# Patient Record
Sex: FEMALE | Race: WHITE | NOT HISPANIC OR LATINO | URBAN - METROPOLITAN AREA
[De-identification: names, ages, dates, MRNs, and addresses within clinical notes are randomized per-mention and may not be internally consistent; named-entity substitution may affect disease eponyms.]

---

## 2024-08-12 ENCOUNTER — APPOINTMENT (OUTPATIENT)
Dept: NEUROLOGY | Facility: HOSPITAL | Age: 39
End: 2024-08-12
Payer: COMMERCIAL

## 2024-09-30 ENCOUNTER — OFFICE VISIT (OUTPATIENT)
Dept: NEUROLOGY | Facility: HOSPITAL | Age: 39
End: 2024-09-30
Payer: COMMERCIAL

## 2024-09-30 VITALS
HEIGHT: 61 IN | BODY MASS INDEX: 24.73 KG/M2 | HEART RATE: 82 BPM | RESPIRATION RATE: 18 BRPM | WEIGHT: 131 LBS | SYSTOLIC BLOOD PRESSURE: 103 MMHG | DIASTOLIC BLOOD PRESSURE: 70 MMHG

## 2024-09-30 DIAGNOSIS — G40.909 SEIZURE DISORDER (MULTI): Primary | ICD-10-CM

## 2024-09-30 PROCEDURE — 99215 OFFICE O/P EST HI 40 MIN: CPT | Performed by: STUDENT IN AN ORGANIZED HEALTH CARE EDUCATION/TRAINING PROGRAM

## 2024-09-30 PROCEDURE — 99205 OFFICE O/P NEW HI 60 MIN: CPT | Performed by: STUDENT IN AN ORGANIZED HEALTH CARE EDUCATION/TRAINING PROGRAM

## 2024-09-30 PROCEDURE — 3008F BODY MASS INDEX DOCD: CPT | Performed by: STUDENT IN AN ORGANIZED HEALTH CARE EDUCATION/TRAINING PROGRAM

## 2024-09-30 PROCEDURE — 1036F TOBACCO NON-USER: CPT | Performed by: STUDENT IN AN ORGANIZED HEALTH CARE EDUCATION/TRAINING PROGRAM

## 2024-09-30 RX ORDER — ATOMOXETINE 80 MG/1
1 CAPSULE ORAL DAILY
COMMUNITY
Start: 2024-07-23

## 2024-09-30 RX ORDER — LEVETIRACETAM 750 MG/1
750 TABLET ORAL DAILY
COMMUNITY
Start: 2024-05-09 | End: 2024-09-30 | Stop reason: SDUPTHER

## 2024-09-30 RX ORDER — LEVETIRACETAM 500 MG/1
500 TABLET ORAL 2 TIMES DAILY
Qty: 60 TABLET | Refills: 1 | Status: SHIPPED | OUTPATIENT
Start: 2024-09-30 | End: 2024-11-29

## 2024-09-30 RX ORDER — FLUOXETINE HYDROCHLORIDE 20 MG/1
20 CAPSULE ORAL 2 TIMES DAILY
COMMUNITY
Start: 2024-03-04

## 2024-09-30 NOTE — PATIENT INSTRUCTIONS
You have been seen today in Epilepsy clinic for your seizures. As we discussed:    - Please continue taking Keppra 500mg twice daily for now    -  We would like to schedule an EMU admission for 3-5 days(on average) . This would allow us to better understand the nature of your episodes and make decisions about treatment.    - We would like to get an MRI scan of your brain with epilepsy protocol    - As we discussed, you must not drive, use heavy machinery, climb heights, or engage in any other activity that would cause harm/death to yourself or others were you to lose awareness/consciousness and have a seizure. These restrictions should stay in place until at least 6 months of seizure freedom and clearance from physician. Alcohol and sleep deprivation may provoke seizures. It is best to avoid alcohol and to get sufficient sleep each night.    - If you have any questions, please call my office   If you have any sudden new concerning or worsening symptoms, call 911 and go to the Emergency Room. Otherwise, it was good seeing you today, and we will see you back after emu admission

## 2024-09-30 NOTE — PROGRESS NOTES
Gloria Vanegas is a 39 y.o. year old  right-handed female who presents for evaluation of seizure    Onset (date): 2015    Evolution and Type(s):    First episode in 2015, when she had 3 GTC  in less than 24 hr, in the setting of Bupropion use. She was on Keppra for few years and eventually weaned off. Next episode in May 2024. She was teaching at work, and felt dizzy.  Next thing she remembers is waking up with other teacher around. She was told she was convulsing and foaming through the mouth. She went to [facility], lactate was elevated. She was placed on Keppra 750 mg bid, but currently taking 750 mg daily with no further seizures. The episode happened also in the setting of double dose of Atomoxetine  (160 mg ) + prozac 80mg.     Risk Factors (including gestational/birth history):  The patient was the product of a normal spontaneous vaginal delivery of a full term birth.  There was no history of febrile convulsions or CNS infections.    Development was normal and developmental milestones were up to par with age.    No history of head trauma with loss of consciousness.   There are no other risk factors for epilepsy.     Past Antiepileptic medication:     Current Antiepileptic Medications:     Past Medical History:   Diagnosis Date    Anesthesia of skin     Numbness and tingling    Anxiety disorder, unspecified     Anxiety    Cervicalgia     Neck pain    Other amnesia     Memory difficulty    Personal history of other specified conditions     History of headache    Personal history of other specified conditions     History of vertigo    Personal history of other specified conditions     History of weakness    Sleep disorder, unspecified     Sleep difficulties        Past Surgical History:   Procedure Laterality Date    OTHER SURGICAL HISTORY  11/21/2018    Appendectomy        Social History     Social History Narrative    Not on file        Allergies   Allergen Reactions    Doxycycline Anaphylaxis     "Codeine Rash        Review of Systems  As per HPI, otherwise all other systems have been reviewed are negative for complaint.           Objective   /70   Pulse 82   Resp 18   Ht 1.549 m (5' 1\")   Wt 59.4 kg (131 lb)   BMI 24.75 kg/m²     Neurological Exam  The patient was alert and oriented to person, time and place. Language, concentration, and memory were intact.  Visual fields were full to confrontation testing. Pupils were equal and reactive to light bilaterally. Extra ocular muscles were normal.  Facial sensation and strength were normal. Tongue movement and shoulder shrug were normal bilaterally.   Gait was normal to toe, heel, and tandem walking without ataxia.  Power, muscle tone, and bulk were normal in all the extremities.  There was no pronator drift or fixation.  Rapid alternating movements were normal.  There was no adventitious movement.  Coordination was intact with no dysmetria.  Sensation was intact.  Tendon reflexes were 2+/4 in all four extremities.  Babinski maneuver elicits flexor response bilaterally.       Results    No MRI head results found for the past 12 months         Assessment/Plan   39 years old patient with 2 seizures inher life, first in 2015 in the setting of Bupropion, and second in 05/2024  after mistakenly took double dose of Atomoxetine and prozac. Prior MRI with no n specifica white matter hyperintensity in the left frontal region. EEG apparently normal.      4D Classification  EPILEPTIC Paroxysmal Events  EZ: unknown  Semiology: aura-> GTC  History of Status Epilepticus:   Etiology: Unknown  Comorbidities: ADHD, anxiety    Plan:   Will continue Keppra 500 mg bid for now  Will do EMU evaluation for evaluation off ASM  MRIepilepsy protocol  Will follow up after emu admission    "

## 2024-11-12 DIAGNOSIS — R56.9 SEIZURE (MULTI): Primary | ICD-10-CM

## 2024-11-22 ENCOUNTER — HOSPITAL ENCOUNTER (INPATIENT)
Dept: NEUROLOGY | Facility: HOSPITAL | Age: 39
DRG: 101 | End: 2024-11-22
Attending: STUDENT IN AN ORGANIZED HEALTH CARE EDUCATION/TRAINING PROGRAM | Admitting: STUDENT IN AN ORGANIZED HEALTH CARE EDUCATION/TRAINING PROGRAM
Payer: COMMERCIAL

## 2024-11-22 ENCOUNTER — APPOINTMENT (OUTPATIENT)
Dept: CARDIOLOGY | Facility: HOSPITAL | Age: 39
DRG: 101 | End: 2024-11-22
Payer: COMMERCIAL

## 2024-11-22 DIAGNOSIS — G40.909 SEIZURE DISORDER (MULTI): Primary | ICD-10-CM

## 2024-11-22 DIAGNOSIS — H92.02 EAR DISCOMFORT, LEFT: ICD-10-CM

## 2024-11-22 DIAGNOSIS — F33.0 MAJOR DEPRESSIVE DISORDER, RECURRENT, MILD (CMS-HCC): ICD-10-CM

## 2024-11-22 LAB
ALBUMIN SERPL BCP-MCNC: 4 G/DL (ref 3.4–5)
ALP SERPL-CCNC: 57 U/L (ref 33–110)
ALT SERPL W P-5'-P-CCNC: 11 U/L (ref 7–45)
ANION GAP SERPL CALC-SCNC: 11 MMOL/L (ref 10–20)
AST SERPL W P-5'-P-CCNC: 11 U/L (ref 9–39)
BASOPHILS # BLD AUTO: 0.08 X10*3/UL (ref 0–0.1)
BASOPHILS NFR BLD AUTO: 0.9 %
BILIRUB SERPL-MCNC: 0.3 MG/DL (ref 0–1.2)
BUN SERPL-MCNC: 11 MG/DL (ref 6–23)
CALCIUM SERPL-MCNC: 8.8 MG/DL (ref 8.6–10.6)
CHLORIDE SERPL-SCNC: 105 MMOL/L (ref 98–107)
CO2 SERPL-SCNC: 26 MMOL/L (ref 21–32)
CREAT SERPL-MCNC: 0.6 MG/DL (ref 0.5–1.05)
EGFRCR SERPLBLD CKD-EPI 2021: >90 ML/MIN/1.73M*2
EOSINOPHIL # BLD AUTO: 0.13 X10*3/UL (ref 0–0.7)
EOSINOPHIL NFR BLD AUTO: 1.4 %
ERYTHROCYTE [DISTWIDTH] IN BLOOD BY AUTOMATED COUNT: 12 % (ref 11.5–14.5)
GLUCOSE SERPL-MCNC: 88 MG/DL (ref 74–99)
HCT VFR BLD AUTO: 33 % (ref 36–46)
HGB BLD-MCNC: 11.6 G/DL (ref 12–16)
IMM GRANULOCYTES # BLD AUTO: 0.03 X10*3/UL (ref 0–0.7)
IMM GRANULOCYTES NFR BLD AUTO: 0.3 % (ref 0–0.9)
LEVETIRACETAM SERPL-MCNC: <2 UG/ML (ref 10–40)
LYMPHOCYTES # BLD AUTO: 2.45 X10*3/UL (ref 1.2–4.8)
LYMPHOCYTES NFR BLD AUTO: 26.5 %
MCH RBC QN AUTO: 31.8 PG (ref 26–34)
MCHC RBC AUTO-ENTMCNC: 35.2 G/DL (ref 32–36)
MCV RBC AUTO: 90 FL (ref 80–100)
MONOCYTES # BLD AUTO: 0.42 X10*3/UL (ref 0.1–1)
MONOCYTES NFR BLD AUTO: 4.5 %
NEUTROPHILS # BLD AUTO: 6.14 X10*3/UL (ref 1.2–7.7)
NEUTROPHILS NFR BLD AUTO: 66.4 %
NRBC BLD-RTO: 0 /100 WBCS (ref 0–0)
PLATELET # BLD AUTO: 377 X10*3/UL (ref 150–450)
POTASSIUM SERPL-SCNC: 3.8 MMOL/L (ref 3.5–5.3)
PROT SERPL-MCNC: 5.7 G/DL (ref 6.4–8.2)
RBC # BLD AUTO: 3.65 X10*6/UL (ref 4–5.2)
SODIUM SERPL-SCNC: 138 MMOL/L (ref 136–145)
WBC # BLD AUTO: 9.3 X10*3/UL (ref 4.4–11.3)

## 2024-11-22 PROCEDURE — 82565 ASSAY OF CREATININE: CPT

## 2024-11-22 PROCEDURE — 36415 COLL VENOUS BLD VENIPUNCTURE: CPT

## 2024-11-22 PROCEDURE — 85025 COMPLETE CBC W/AUTO DIFF WBC: CPT

## 2024-11-22 PROCEDURE — 93005 ELECTROCARDIOGRAM TRACING: CPT

## 2024-11-22 PROCEDURE — 80177 DRUG SCRN QUAN LEVETIRACETAM: CPT

## 2024-11-22 PROCEDURE — 1100000003 HC PRIVATE ROOM DAILY - LAKESIDE

## 2024-11-22 RX ORDER — ACETAMINOPHEN 650 MG/1
650 SUPPOSITORY RECTAL EVERY 4 HOURS PRN
Status: DISCONTINUED | OUTPATIENT
Start: 2024-11-22 | End: 2024-11-25 | Stop reason: HOSPADM

## 2024-11-22 RX ORDER — ACETAMINOPHEN 325 MG/1
650 TABLET ORAL EVERY 4 HOURS PRN
Status: DISCONTINUED | OUTPATIENT
Start: 2024-11-22 | End: 2024-11-25 | Stop reason: HOSPADM

## 2024-11-22 RX ORDER — FLUOXETINE HYDROCHLORIDE 20 MG/1
20 CAPSULE ORAL 2 TIMES DAILY
Status: DISCONTINUED | OUTPATIENT
Start: 2024-11-22 | End: 2024-11-22

## 2024-11-22 RX ORDER — ACETAMINOPHEN 160 MG/5ML
650 SOLUTION ORAL EVERY 4 HOURS PRN
Status: DISCONTINUED | OUTPATIENT
Start: 2024-11-22 | End: 2024-11-25 | Stop reason: HOSPADM

## 2024-11-22 RX ORDER — ATOMOXETINE 80 MG/1
80 CAPSULE ORAL DAILY
Status: DISCONTINUED | OUTPATIENT
Start: 2024-11-22 | End: 2024-11-25 | Stop reason: HOSPADM

## 2024-11-22 RX ORDER — LORAZEPAM 2 MG/ML
2 INJECTION INTRAMUSCULAR EVERY 5 MIN PRN
Status: DISCONTINUED | OUTPATIENT
Start: 2024-11-22 | End: 2024-11-25 | Stop reason: HOSPADM

## 2024-11-22 RX ORDER — FLUOXETINE HYDROCHLORIDE 40 MG/1
40 CAPSULE ORAL DAILY
Status: DISCONTINUED | OUTPATIENT
Start: 2024-11-23 | End: 2024-11-25 | Stop reason: HOSPADM

## 2024-11-22 RX ORDER — DIPHENHYDRAMINE HCL 25 MG
25 CAPSULE ORAL EVERY 6 HOURS PRN
Status: DISCONTINUED | OUTPATIENT
Start: 2024-11-22 | End: 2024-11-25 | Stop reason: HOSPADM

## 2024-11-22 RX ORDER — ATOMOXETINE 80 MG/1
80 CAPSULE ORAL DAILY
Status: DISCONTINUED | OUTPATIENT
Start: 2024-11-22 | End: 2024-11-22

## 2024-11-22 RX ORDER — LEVOTHYROXINE SODIUM 50 UG/1
50 TABLET ORAL
COMMUNITY
Start: 2024-10-30

## 2024-11-22 RX ORDER — FLUOXETINE HYDROCHLORIDE 40 MG/1
40 CAPSULE ORAL DAILY
COMMUNITY
Start: 2024-05-07

## 2024-11-22 RX ORDER — LEVOTHYROXINE SODIUM 50 UG/1
50 TABLET ORAL DAILY
Status: DISCONTINUED | OUTPATIENT
Start: 2024-11-23 | End: 2024-11-25 | Stop reason: HOSPADM

## 2024-11-22 SDOH — SOCIAL STABILITY: SOCIAL INSECURITY: DO YOU FEEL ANYONE HAS EXPLOITED OR TAKEN ADVANTAGE OF YOU FINANCIALLY OR OF YOUR PERSONAL PROPERTY?: NO

## 2024-11-22 SDOH — SOCIAL STABILITY: SOCIAL INSECURITY: ABUSE: ADULT

## 2024-11-22 SDOH — SOCIAL STABILITY: SOCIAL INSECURITY
WITHIN THE LAST YEAR, HAVE YOU BEEN RAPED OR FORCED TO HAVE ANY KIND OF SEXUAL ACTIVITY BY YOUR PARTNER OR EX-PARTNER?: NO

## 2024-11-22 SDOH — ECONOMIC STABILITY: FOOD INSECURITY: WITHIN THE PAST 12 MONTHS, THE FOOD YOU BOUGHT JUST DIDN'T LAST AND YOU DIDN'T HAVE MONEY TO GET MORE.: NEVER TRUE

## 2024-11-22 SDOH — SOCIAL STABILITY: SOCIAL INSECURITY: WITHIN THE LAST YEAR, HAVE YOU BEEN HUMILIATED OR EMOTIONALLY ABUSED IN OTHER WAYS BY YOUR PARTNER OR EX-PARTNER?: NO

## 2024-11-22 SDOH — ECONOMIC STABILITY: FOOD INSECURITY: WITHIN THE PAST 12 MONTHS, YOU WORRIED THAT YOUR FOOD WOULD RUN OUT BEFORE YOU GOT THE MONEY TO BUY MORE.: NEVER TRUE

## 2024-11-22 SDOH — SOCIAL STABILITY: SOCIAL INSECURITY: DO YOU FEEL UNSAFE GOING BACK TO THE PLACE WHERE YOU ARE LIVING?: NO

## 2024-11-22 SDOH — SOCIAL STABILITY: SOCIAL INSECURITY
WITHIN THE LAST YEAR, HAVE YOU BEEN KICKED, HIT, SLAPPED, OR OTHERWISE PHYSICALLY HURT BY YOUR PARTNER OR EX-PARTNER?: NO

## 2024-11-22 SDOH — ECONOMIC STABILITY: INCOME INSECURITY: IN THE PAST 12 MONTHS HAS THE ELECTRIC, GAS, OIL, OR WATER COMPANY THREATENED TO SHUT OFF SERVICES IN YOUR HOME?: NO

## 2024-11-22 SDOH — SOCIAL STABILITY: SOCIAL INSECURITY: DOES ANYONE TRY TO KEEP YOU FROM HAVING/CONTACTING OTHER FRIENDS OR DOING THINGS OUTSIDE YOUR HOME?: NO

## 2024-11-22 SDOH — SOCIAL STABILITY: SOCIAL INSECURITY: WITHIN THE LAST YEAR, HAVE YOU BEEN AFRAID OF YOUR PARTNER OR EX-PARTNER?: NO

## 2024-11-22 SDOH — SOCIAL STABILITY: SOCIAL INSECURITY: HAS ANYONE EVER THREATENED TO HURT YOUR FAMILY OR YOUR PETS?: NO

## 2024-11-22 SDOH — SOCIAL STABILITY: SOCIAL INSECURITY: ARE YOU OR HAVE YOU BEEN THREATENED OR ABUSED PHYSICALLY, EMOTIONALLY, OR SEXUALLY BY ANYONE?: NO

## 2024-11-22 SDOH — SOCIAL STABILITY: SOCIAL INSECURITY: HAVE YOU HAD THOUGHTS OF HARMING ANYONE ELSE?: NO

## 2024-11-22 SDOH — SOCIAL STABILITY: SOCIAL INSECURITY: ARE THERE ANY APPARENT SIGNS OF INJURIES/BEHAVIORS THAT COULD BE RELATED TO ABUSE/NEGLECT?: NO

## 2024-11-22 SDOH — SOCIAL STABILITY: SOCIAL INSECURITY: WERE YOU ABLE TO COMPLETE ALL THE BEHAVIORAL HEALTH SCREENINGS?: YES

## 2024-11-22 ASSESSMENT — COGNITIVE AND FUNCTIONAL STATUS - GENERAL
DAILY ACTIVITIY SCORE: 24
PATIENT BASELINE BEDBOUND: NO
MOBILITY SCORE: 24
MOBILITY SCORE: 24
DAILY ACTIVITIY SCORE: 24

## 2024-11-22 ASSESSMENT — ENCOUNTER SYMPTOMS
DIZZINESS: 1
EYE PAIN: 0
EYE DISCHARGE: 0
CHEST TIGHTNESS: 0
DIARRHEA: 0
CONSTIPATION: 1
DIFFICULTY URINATING: 0
SORE THROAT: 0
PALPITATIONS: 1
RHINORRHEA: 0
ACTIVITY CHANGE: 0
APNEA: 0
SHORTNESS OF BREATH: 0
NAUSEA: 0
COUGH: 0
HEADACHES: 1

## 2024-11-22 ASSESSMENT — ACTIVITIES OF DAILY LIVING (ADL)
TOILETING: INDEPENDENT
FEEDING YOURSELF: INDEPENDENT
DRESSING YOURSELF: INDEPENDENT
HEARING - RIGHT EAR: FUNCTIONAL
BATHING: INDEPENDENT
WALKS IN HOME: INDEPENDENT
PATIENT'S MEMORY ADEQUATE TO SAFELY COMPLETE DAILY ACTIVITIES?: YES
GROOMING: INDEPENDENT
LACK_OF_TRANSPORTATION: NO
ADEQUATE_TO_COMPLETE_ADL: YES
JUDGMENT_ADEQUATE_SAFELY_COMPLETE_DAILY_ACTIVITIES: YES
LACK_OF_TRANSPORTATION: NO
HEARING - LEFT EAR: FUNCTIONAL

## 2024-11-22 ASSESSMENT — COLUMBIA-SUICIDE SEVERITY RATING SCALE - C-SSRS
2. HAVE YOU ACTUALLY HAD ANY THOUGHTS OF KILLING YOURSELF?: NO
1. IN THE PAST MONTH, HAVE YOU WISHED YOU WERE DEAD OR WISHED YOU COULD GO TO SLEEP AND NOT WAKE UP?: NO
6. HAVE YOU EVER DONE ANYTHING, STARTED TO DO ANYTHING, OR PREPARED TO DO ANYTHING TO END YOUR LIFE?: NO

## 2024-11-22 ASSESSMENT — LIFESTYLE VARIABLES
AUDIT-C TOTAL SCORE: 0
HOW OFTEN DO YOU HAVE A DRINK CONTAINING ALCOHOL: NEVER
SKIP TO QUESTIONS 9-10: 1
HOW OFTEN DO YOU HAVE 6 OR MORE DRINKS ON ONE OCCASION: NEVER
HOW MANY STANDARD DRINKS CONTAINING ALCOHOL DO YOU HAVE ON A TYPICAL DAY: PATIENT DOES NOT DRINK
AUDIT-C TOTAL SCORE: 0

## 2024-11-22 ASSESSMENT — PAIN - FUNCTIONAL ASSESSMENT
PAIN_FUNCTIONAL_ASSESSMENT: 0-10
PAIN_FUNCTIONAL_ASSESSMENT: 0-10

## 2024-11-22 ASSESSMENT — PAIN SCALES - GENERAL
PAINLEVEL_OUTOF10: 0 - NO PAIN
PAINLEVEL_OUTOF10: 0 - NO PAIN

## 2024-11-22 ASSESSMENT — PATIENT HEALTH QUESTIONNAIRE - PHQ9
1. LITTLE INTEREST OR PLEASURE IN DOING THINGS: SEVERAL DAYS
2. FEELING DOWN, DEPRESSED OR HOPELESS: SEVERAL DAYS
SUM OF ALL RESPONSES TO PHQ9 QUESTIONS 1 & 2: 2

## 2024-11-22 ASSESSMENT — VISUAL ACUITY: VA_NORMAL: 1

## 2024-11-22 NOTE — PROGRESS NOTES
"Pharmacy Medication History Review    Gloria Vanegas \"Pooja\" is a 39 y.o. female admitted for Seizure disorder ([facility]). Pharmacy reviewed the patient's zasje-bp-xrutqqoad medications and allergies for accuracy.    The list below reflects the updated PTA list.   Prior to Admission Medications   Prescriptions Last Dose Informant   FLUoxetine (PROzac) 40 mg capsule 11/22/2024 Self   Sig: Take 1 capsule (40 mg) by mouth once daily.   atomoxetine (Strattera) 80 mg capsule 11/22/2024 Self   Sig: Take 1 capsule (80 mg) by mouth once daily.   levETIRAcetam (Keppra) 500 mg tablet 11/22/2024 Self   Sig: Take 1 tablet (500 mg) by mouth 2 times a day.   levothyroxine (Synthroid, Levoxyl) 50 mcg tablet 11/22/2024 Self   Sig: Take 1 tablet (50 mcg) by mouth once daily.      Facility-Administered Medications: None        The list below reflects the updated allergy list. Please review each documented allergy for additional clarification and justification.  Allergies  Reviewed by Kelli Schafer RN on 11/22/2024        Severity Reactions Comments    Doxycycline High Anaphylaxis     Codeine Low Rash             Patient declines M2B at discharge. Pharmacy has been updated to [facility].    Sources used to complete the med history include:    [facility]  Pharmacy dispense history  Patient interview Good historian  Chart Review  Care Everywhere   9/30/24 Progress Note neurology   Qing SKELTON MD     Below are additional concerns with the patient's PTA list.  None    Medications ADDED:  Levothyroxine 50 mcg  Fluoxetine 40 mg  Medications CHANGED:  none  Medications REMOVED:   Fluoxetine 20 mg     Ismael Vergara [facility].   Transitions of Care Pharmacist  [facility]  Please reach out via Secure Chat for questions, or if no response call [phone] or vocera [facility]    "

## 2024-11-22 NOTE — H&P
"History Of Present Illness  Gloria Vanegas \"Pooja\" is a 39 y.o. right handed female with PMHx of ADHD, anxiety, and hypothyroidism who presents to [facility] (EMU) as a scheduled admission for baseline EEG off ASMs.     Pt follows with Dr. Trujillo outpatient, recently seen 9/30/24. At this visit it was discussed how pt has had 2 episodes of seizures in her life, appear to be in the setting of medication adverse effects/overuse. EMU admission to evaluate electrographic activity off anti-seizure medication.     Anamnesis per patient/history of paroxysmal events per Dr. Trujillo outpatient visit (9/30/24):  First episode was in 2015, when she had 3 GTCs in less than 24 hrs in the setting of Bupropion use. Pt described feeling dizzy and hot before she had the first GTC. Supposedly lasted 10 minutes per her friend. She had two more GTCs that day (one in the ambulance, one in the ED) but does not remember regaining consciousness in between them. The next thing she remembers is waking up in a scanner and being confused. Confusion and fatigue lasted for the next couple days. She was on Keppra for few years and eventually weaned off. Seizure free for ~5 years.     The next episode occurred in May 2024. 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 Per her colleague, she was then seen convulsing and foaming at the mouth. Episode lasted 5 minutes, and the next thing she remembers is waking up to someone asking her who the president is. Pt said she bit her tongue and lost control of her bladder during this episode. She went to [facility] ED, where her lactate was elevated. Discharged on Keppra 750 mg BID, but currently taking 500 mg BID with no further seizures. The episode happened also in the setting of double dose of Atomoxetine (160mg) and Prozac 80mg.    Pt notes that she has episodes of dizziness associated with migraines 2-3 times a month and has episodes of feeling dizzy " and hot without subsequent seizure around once a month. She attributes these symptoms to stress, dehydration, and sleep deprivation. Denies any visual/auditory/gustatory/olfactory auras.     Anamnesis per collateral: events were unwitnessed/not documented from prior admissions    4D Classification  Paroxysmal Events  EZ: unknown  Semiology: vertiginous (a)-> autonomic (a) -> bilateral UE/LE tonic clonic  Etiology: Unknown  Comorbidities: ADHD, anxiety    Epilepsy Risk Factors:   Birth History: /full-term.  Pre- complications: None.  History of febrile confuslions of CNS infections: None.  Developmental milestones: Normal.  History of head trauma with loss of consciousness: None.    Previous EEG results: no reports but purportedly normal per note on 24  Previous MRI results: read (no image available) from 12/10/22 shows no acute intracranial abnormality; 18 showed isolated hyperintense T2 FLAIR focus within subcortical white matter (unchanged compared to )    Past Medical History  Past Medical History:   Diagnosis Date    Anesthesia of skin     Numbness and tingling    Anxiety disorder, unspecified     Anxiety    Cervicalgia     Neck pain    Other amnesia     Memory difficulty    Personal history of other specified conditions     History of headache    Personal history of other specified conditions     History of vertigo    Personal history of other specified conditions     History of weakness    Sleep disorder, unspecified     Sleep difficulties     Surgical History  Past Surgical History:   Procedure Laterality Date    OTHER SURGICAL HISTORY  2018    Appendectomy     Social History  Social History     Tobacco Use    Smoking status: Never     Passive exposure: Never    Smokeless tobacco: Never   Substance Use Topics    Alcohol use: Yes    Drug use: Never     Allergies  Doxycycline and Codeine  Medications Prior to Admission   Medication Sig Dispense Refill Last Dose/Taking     atomoxetine (Strattera) 80 mg capsule Take 1 capsule (80 mg) by mouth once daily.       FLUoxetine (PROzac) 20 mg capsule Take 1 capsule (20 mg) by mouth 2 times a day.       levETIRAcetam (Keppra) 500 mg tablet Take 1 tablet (500 mg) by mouth 2 times a day. 60 tablet 1        Review of Systems   Constitutional:  Negative for activity change.   HENT:  Negative for congestion, ear discharge, ear pain, hearing loss, postnasal drip, rhinorrhea, sneezing and sore throat.    Eyes:  Negative for pain, discharge and visual disturbance.   Respiratory:  Negative for apnea, cough, chest tightness and shortness of breath.    Cardiovascular:  Positive for palpitations. Negative for chest pain.   Gastrointestinal:  Positive for constipation. Negative for diarrhea and nausea.   Genitourinary:  Negative for difficulty urinating.   Neurological:  Positive for dizziness and headaches.     Last Recorded Vitals  Blood pressure 114/74, pulse 78, temperature 36.3 °C (97.3 °F), resp. rate 20, SpO2 99%.    Neurological Exam  Mental Status  Awake and alert. Speech is normal. Language is fluent with no aphasia. Fund of knowledge is appropriate for level of education. Apraxia absent.    Cranial Nerves  CN II: Visual acuity is normal. Visual fields full to confrontation.  CN III, IV, VI: Extraocular movements intact bilaterally. Normal lids and orbits bilaterally. Pupils equal round and reactive to light bilaterally.  CN V: Facial sensation is normal.  CN VII: Full and symmetric facial movement.  CN VIII: Hearing is normal.  CN IX, X: Palate elevates symmetrically  CN XI: Shoulder shrug strength is normal.  CN XII: Tongue midline without atrophy or fasciculations.    Motor  Normal muscle bulk throughout. No fasciculations present. Normal muscle tone. No abnormal involuntary movements. No pronator drift.    Sensory  Light touch is normal in upper and lower extremities.     Reflexes                                            Right                       Left  Brachioradialis                    3+                         3+  Biceps                                 3+                         3+  Patellar                                3+                         3+  Achilles                                3+                         3+  Right Plantar: downgoing  Left Plantar: downgoing    Right pathological reflexes: Oksana's absent. Tromner absent. Suprapatellar present.  Left pathological reflexes: Oksana's absent. Tromner absent. Suprapatellar present.    Coordination    Finger-to-nose, rapid alternating movements and heel-to-shin normal bilaterally without dysmetria.    Physical Exam  Constitutional:       General: She is awake. She is not in acute distress.  HENT:      Head: Normocephalic and atraumatic.      Right Ear: External ear normal.      Left Ear: External ear normal.      Nose: Nose normal.      Mouth/Throat:      Mouth: Mucous membranes are moist.      Pharynx: Oropharynx is clear. No oropharyngeal exudate or posterior oropharyngeal erythema.   Eyes:      General: Lids are normal.      Extraocular Movements: Extraocular movements intact.      Pupils: Pupils are equal, round, and reactive to light.   Pulmonary:      Effort: Pulmonary effort is normal.   Neurological:      Mental Status: She is alert.      Coordination: Coordination is intact.      Deep Tendon Reflexes:      Reflex Scores:       Bicep reflexes are 3+ on the right side and 3+ on the left side.       Brachioradialis reflexes are 3+ on the right side and 3+ on the left side.       Patellar reflexes are 3+ on the right side and 3+ on the left side.       Achilles reflexes are 3+ on the right side and 3+ on the left side.  Psychiatric:         Speech: Speech normal.       MRI Brain with and without contrast (8/20/18):  IMPRESSION:  1. No acute intracranial abnormality.  2. Isolated hyperintense T2 FLAIR focus within the subcortical white  matter, grossly unchanged from prior  "examination dating back to 2015.     Assessment/Plan   Gloria Vanegas \"Pooja\" is a 39 y.o. right handed female with a history notable for anxiety, ADHD, and hypothyroidism who presents to the [facility] Epilepsy Monitoring Unit (EMU) as a scheduled admission for baseline EEG off ASMs per Dr. Nolasco's recommendations.     Given tongue bite, urinary incontinence, LOC, and post-ictal state associated with most recent event, patient's episodes are likely consistent with seizures. However, it is unclear whether she has an inherent underlying epilepsy or if the seizures were provoked by medications. Five year seizure-free period off of AEDs, association of episode with stress and presyncopal symptoms (dizziness and feeling hot), and 10 minute duration of initial 2015 seizure may alternatively suggest PNES. Plan to stop Keppra and obtain baseline EEG during this EMU admission. Also will consider panic attacks as etiology, although lower on the differential.    #Paroxysmal events c/f seizures  - admit to EMU today (approved for 5 days)  - start continuous video EEG  - obtain baseline Keppra troughs  - wean home Keppra (500 mg BID)  - MRI Brain w/wo HARNESS protocol on day of dc  - give Ativan 2mg IVP q2min and page the epilepsy fellow & epilepsy service STAT for a generalized motor seizure lasting 3 minutes or greater  - apply seizure precautions  - apply photic stim and/or sleep deprivation if no episodes are captured with the above plan    F: PRN   E: replete PRN for Mg<1.5, Phos<2.5, K<3.5.  N: Regular  Access: PIV  PPx: SCDs     Code Status: FULL CODE  NOK/SDM: spouse - Freeman Vanegas ([phone])    Isela Fernandez, MS4    I have reviewed and edited the information above and I agree with the assessment and plan as outlined by the medical student.    Branden Mcmahon, DO  PGY-2 Neurology  "

## 2024-11-22 NOTE — PROGRESS NOTES
11/22/24 1544   Discharge Planning   Living Arrangements Spouse/significant other   Support Systems Spouse/significant other   Assistance Needed none at this time   Type of Residence Private residence   Number of Stairs to Enter Residence 4   Number of Stairs Within Residence 13   Do you have animals or pets at home? Yes   Type of Animals or Pets a cat   Home or Post Acute Services None   Expected Discharge Disposition Home   Does the patient need discharge transport arranged? No  ( will transport home)   Financial Resource Strain   How hard is it for you to pay for the very basics like food, housing, medical care, and heating? Not hard   Housing Stability   In the last 12 months, was there a time when you were not able to pay the mortgage or rent on time? N   In the past 12 months, how many times have you moved where you were living? 0   At any time in the past 12 months, were you homeless or living in a shelter (including now)? N   Transportation Needs   In the past 12 months, has lack of transportation kept you from medical appointments or from getting medications? no   In the past 12 months, has lack of transportation kept you from meetings, work, or from getting things needed for daily living? No     Assessment Note:  Met with pt introduced myself as  and member of the Care Transitions team for discharge planning.   Pt feels safe at home with her .  She stated she was independent prior to admission.  Pt drives to [address].  Pt's address, phone number and contact information was verified. Pt does not have any other questions/concerns at this time.     Previous Home Care: none  DME: none  Pharmacy: [facility]  Falls: no recent falls  PCP:  Dr. Shanelle Thakur last saw her last year has appointment next month in December   Transport home:  will transport home  Pt is independent will return home with no needs.  Care transitions will continue to be available if discharge needs  arise.  Kristen PEREZ, LISW-S (ex [phone])

## 2024-11-23 ENCOUNTER — HOSPITAL ENCOUNTER (INPATIENT)
Dept: NEUROLOGY | Facility: HOSPITAL | Age: 39
Discharge: HOME | DRG: 101 | End: 2024-11-23
Payer: COMMERCIAL

## 2024-11-23 PROCEDURE — 95720 EEG PHY/QHP EA INCR W/VEEG: CPT | Performed by: STUDENT IN AN ORGANIZED HEALTH CARE EDUCATION/TRAINING PROGRAM

## 2024-11-23 PROCEDURE — 2500000001 HC RX 250 WO HCPCS SELF ADMINISTERED DRUGS (ALT 637 FOR MEDICARE OP)

## 2024-11-23 PROCEDURE — 95716 VEEG EA 12-26HR CONT MNTR: CPT

## 2024-11-23 PROCEDURE — 1100000003 HC PRIVATE ROOM DAILY - LAKESIDE

## 2024-11-23 PROCEDURE — 99222 1ST HOSP IP/OBS MODERATE 55: CPT

## 2024-11-23 PROCEDURE — 95700 EEG CONT REC W/VID EEG TECH: CPT

## 2024-11-23 PROCEDURE — 2500000002 HC RX 250 W HCPCS SELF ADMINISTERED DRUGS (ALT 637 FOR MEDICARE OP, ALT 636 FOR OP/ED)

## 2024-11-23 SDOH — ECONOMIC STABILITY: GENERAL

## 2024-11-23 SDOH — ECONOMIC STABILITY: HOUSING INSECURITY: IN THE LAST 12 MONTHS, WAS THERE A TIME WHEN YOU WERE NOT ABLE TO PAY THE MORTGAGE OR RENT ON TIME?: NO

## 2024-11-23 SDOH — ECONOMIC STABILITY: INCOME INSECURITY: IN THE LAST 12 MONTHS, WAS THERE A TIME WHEN YOU WERE NOT ABLE TO PAY THE MORTGAGE OR RENT ON TIME?: NO

## 2024-11-23 SDOH — ECONOMIC STABILITY: FOOD INSECURITY
WITHIN THE PAST 12 MONTHS, YOU WORRIED THAT YOUR FOOD WOULD RUN OUT BEFORE YOU GOT THE MONEY TO BUY MORE.: SOMETIMES TRUE

## 2024-11-23 SDOH — ECONOMIC STABILITY: FOOD INSECURITY: WITHIN THE PAST 12 MONTHS, THE FOOD YOU BOUGHT JUST DIDN'T LAST AND YOU DIDN'T HAVE MONEY TO GET MORE.: NEVER TRUE

## 2024-11-23 SDOH — ECONOMIC STABILITY: FOOD INSECURITY: WITHIN THE PAST 12 MONTHS, YOU WORRIED THAT YOUR FOOD WOULD RUN OUT BEFORE YOU GOT MONEY TO BUY MORE.: SOMETIMES TRUE

## 2024-11-23 SDOH — ECONOMIC STABILITY: TRANSPORTATION INSECURITY: IN THE PAST 12 MONTHS, HAS LACK OF TRANSPORTATION KEPT YOU FROM MEDICAL APPOINTMENTS OR FROM GETTING MEDICATIONS?: NO

## 2024-11-23 SDOH — ECONOMIC STABILITY: TRANSPORTATION INSECURITY

## 2024-11-23 SDOH — ECONOMIC STABILITY: FOOD INSECURITY

## 2024-11-23 SDOH — ECONOMIC STABILITY: HOUSING INSECURITY

## 2024-11-23 SDOH — ECONOMIC STABILITY: HOUSING INSECURITY: IN THE PAST 12 MONTHS HAS THE ELECTRIC, GAS, OIL, OR WATER COMPANY THREATENED TO SHUT OFF SERVICES IN YOUR HOME?: NO

## 2024-11-23 SDOH — ECONOMIC STABILITY: HOUSING INSECURITY: IN THE LAST 12 MONTHS, HOW MANY PLACES HAVE YOU LIVED?: 1

## 2024-11-23 SDOH — ECONOMIC STABILITY: TRANSPORTATION INSECURITY
IN THE PAST 12 MONTHS, HAS LACK OF TRANSPORTATION KEPT YOU FROM MEETINGS, WORK, OR FROM GETTING THINGS NEEDED FOR DAILY LIVING?: YES

## 2024-11-23 SDOH — ECONOMIC STABILITY: TRANSPORTATION INSECURITY
IN THE PAST 12 MONTHS, HAS THE LACK OF TRANSPORTATION KEPT YOU FROM MEDICAL APPOINTMENTS OR FROM GETTING MEDICATIONS?: NO

## 2024-11-23 SDOH — ECONOMIC STABILITY: HOUSING INSECURITY
IN THE LAST 12 MONTHS, WAS THERE A TIME WHEN YOU DID NOT HAVE A STEADY PLACE TO SLEEP OR SLEPT IN A SHELTER (INCLUDING NOW)?: NO

## 2024-11-23 ASSESSMENT — COGNITIVE AND FUNCTIONAL STATUS - GENERAL
MOBILITY SCORE: 24
MOBILITY SCORE: 24
DAILY ACTIVITIY SCORE: 24
DAILY ACTIVITIY SCORE: 24

## 2024-11-23 ASSESSMENT — SOCIAL DETERMINANTS OF HEALTH (SDOH): IN THE PAST 12 MONTHS, HAS THE ELECTRIC, GAS, OIL, OR WATER COMPANY THREATENED TO SHUT OFF SERVICE IN YOUR HOME?: NO

## 2024-11-23 ASSESSMENT — PAIN SCALES - GENERAL
PAINLEVEL_OUTOF10: 0 - NO PAIN

## 2024-11-23 ASSESSMENT — PAIN - FUNCTIONAL ASSESSMENT: PAIN_FUNCTIONAL_ASSESSMENT: 0-10

## 2024-11-23 ASSESSMENT — ACTIVITIES OF DAILY LIVING (ADL): LACK_OF_TRANSPORTATION: YES

## 2024-11-23 NOTE — CARE PLAN
The patient's goals for the shift include      The clinical goals for the shift include Will remain free from fall/injury throughout shift.    Over the shift, the patient did meet goal. Continue to maintain fall/seizure precautions.

## 2024-11-23 NOTE — PROGRESS NOTES
"Gloria Vanegas \"Wendi" is a 39 y.o. female on day 1 of admission to the AMU.    O:  24 Hour Vitals  Temp:  [36.3 °C (97.3 °F)-36.7 °C (98.1 °F)] 36.5 °C (97.7 °F)  Heart Rate:  [70-78] 70  Resp:  [12-20] 18  BP: ()/(65-74) 99/68    Temp (24hrs), Av.5 °C (97.7 °F), Min:36.3 °C (97.3 °F), Max:36.7 °C (98.1 °F)     24 hour Intake/Output    Intake/Output Summary (Last 24 hours) at 2024 1302  Last data filed at 2024 0900  Gross per 24 hour   Intake 240 ml   Output --   Net 240 ml          Medications   Scheduled Medications  atomoxetine, 80 mg, oral, Daily  FLUoxetine, 40 mg, oral, Daily  levothyroxine, 50 mcg, oral, Daily     Continuous Medications    PRN Medications  PRN medications: acetaminophen **OR** acetaminophen **OR** acetaminophen, diphenhydrAMINE, LORazepam       ==================================================  Classification of Paroxysmal Episodes  ==================================================      No known classification of paroxysmal episodes.     ==================================================  Current Video/EEG  ==================================================      No known Video/EEG Finding    ==================================================  Impression and Plan  ==================================================      Evolution in last 24 hours: No events. EEG is normal      Subjective: No complaints      Objective: No change      Current non-AED Rx:       Impression: This EEG is normal.       Plans:           1. Will continue to hold keppra          2. Sleep deprive tonight.     ==================================================  Medication Table  ==================================================      1. Date: 2024  Levetiracetam(dose: 500-0)        2. Date: 2024  Levetiracetam(dose: 0-0)      ==================================================  Seizure Onset Table  ==================================================      No known seizure " onsets.    Ismael Thompson DO  Clinical Epilepsy Fellow

## 2024-11-24 ENCOUNTER — HOSPITAL ENCOUNTER (INPATIENT)
Dept: NEUROLOGY | Facility: HOSPITAL | Age: 39
Discharge: HOME | DRG: 101 | End: 2024-11-24
Payer: COMMERCIAL

## 2024-11-24 VITALS
WEIGHT: 131 LBS | HEIGHT: 61 IN | BODY MASS INDEX: 24.73 KG/M2 | RESPIRATION RATE: 12 BRPM | DIASTOLIC BLOOD PRESSURE: 74 MMHG | TEMPERATURE: 97.7 F | HEART RATE: 76 BPM | OXYGEN SATURATION: 97 % | SYSTOLIC BLOOD PRESSURE: 109 MMHG

## 2024-11-24 PROCEDURE — 2500000002 HC RX 250 W HCPCS SELF ADMINISTERED DRUGS (ALT 637 FOR MEDICARE OP, ALT 636 FOR OP/ED)

## 2024-11-24 PROCEDURE — 95720 EEG PHY/QHP EA INCR W/VEEG: CPT | Performed by: STUDENT IN AN ORGANIZED HEALTH CARE EDUCATION/TRAINING PROGRAM

## 2024-11-24 PROCEDURE — 2500000001 HC RX 250 WO HCPCS SELF ADMINISTERED DRUGS (ALT 637 FOR MEDICARE OP): Performed by: CASE MANAGER/CARE COORDINATOR

## 2024-11-24 PROCEDURE — 99231 SBSQ HOSP IP/OBS SF/LOW 25: CPT | Performed by: CASE MANAGER/CARE COORDINATOR

## 2024-11-24 PROCEDURE — 95716 VEEG EA 12-26HR CONT MNTR: CPT

## 2024-11-24 PROCEDURE — 1100000003 HC PRIVATE ROOM DAILY - LAKESIDE

## 2024-11-24 PROCEDURE — 2500000001 HC RX 250 WO HCPCS SELF ADMINISTERED DRUGS (ALT 637 FOR MEDICARE OP)

## 2024-11-24 RX ORDER — LEVETIRACETAM 500 MG/1
500 TABLET ORAL 2 TIMES DAILY
Status: DISCONTINUED | OUTPATIENT
Start: 2024-11-24 | End: 2024-11-25 | Stop reason: HOSPADM

## 2024-11-24 ASSESSMENT — COGNITIVE AND FUNCTIONAL STATUS - GENERAL
MOBILITY SCORE: 24
MOBILITY SCORE: 24
DAILY ACTIVITIY SCORE: 24

## 2024-11-24 ASSESSMENT — PAIN - FUNCTIONAL ASSESSMENT
PAIN_FUNCTIONAL_ASSESSMENT: 0-10
PAIN_FUNCTIONAL_ASSESSMENT: 0-10

## 2024-11-24 ASSESSMENT — PAIN SCALES - GENERAL
PAINLEVEL_OUTOF10: 0 - NO PAIN

## 2024-11-24 NOTE — HOSPITAL COURSE
Ms. Vanegas was admitted for a 3 day EMU stay. Patient noted that she often misses her nighttime Keppra doses about 3x a week. Keppra was stopped during admission. Trough levels were <2 on labs. Photic stimulation, hyperventilation, and sleep deprivation were utilized to provoke events. EEG did not show any epileptic activity, and pt did not have any clinical events. Keppra was permanently discontinued, and pt will receive MRI Brain outpatient.  She will follow-up with Neurology to monitor off anti-seizure medications.

## 2024-11-24 NOTE — DISCHARGE SUMMARY
Discharge Diagnosis  Seizure disorder (Multi)    Epilepsy Quality and Core Measure Topics  {Quality and Core Measure Topics:75053}  First Time Seizures  {First time seizure:17893}  Is this patient seizure free?  {Seizure Free Status:48369}  Should this patient observe standard seizure precautions?  {Seizure Precautions:98195}  Medication Side Effects Discussion Statement  {Medication Side Effects Discussion Statement:17863}  Women with Epilepsy Discussion  {Women with Epilepsy Discussion:44180}    Issues Requiring Follow-Up  Keppra outpatient medication adjustment with Dr. Trujillo  Outpatient MRI ordered  See PCP for anemia  ENT referral for ear whooshing ***    Test Results Pending At Discharge  Pending Labs       No current pending labs.            Hospital Course       Pertinent Physical Exam At Time of Discharge  Physical Exam    Home Medications     Medication List      ASK your doctor about these medications     atomoxetine 80 mg capsule; Commonly known as: Strattera   FLUoxetine 40 mg capsule; Commonly known as: PROzac; Ask about: Which   instructions should I use?   levETIRAcetam 500 mg tablet; Commonly known as: Keppra; Take 1 tablet   (500 mg) by mouth 2 times a day.   levothyroxine 50 mcg tablet; Commonly known as: Synthroid, Levoxyl       Outpatient Follow-Up  Future Appointments   Date Time Provider Department Center   11/25/2024  8:00 AM [facility] AM[facility] EEG ROOM 1 [facility]   11/26/2024  8:00 AM [facility] AMU EEG ROOM 1 [facility]   11/26/2024  5:00 PM [facility] MRI 3 [facility]

## 2024-11-24 NOTE — PROGRESS NOTES
"Gloria Vanegas \"Wendi" is a 39 y.o. female on day 2 of admission to the [facility].    O:  24 Hour Vitals  Temp:  [36.5 °C (97.7 °F)-36.8 °C (98.2 °F)] 36.8 °C (98.2 °F)  Heart Rate:  [70-87] 76  Resp:  [16-18] 16  BP: ()/(68-71) 110/71    Temp (24hrs), Av.6 °C (97.9 °F), Min:36.5 °C (97.7 °F), Max:36.8 °C (98.2 °F)     24 hour Intake/Output    Intake/Output Summary (Last 24 hours) at 2024 0609  Last data filed at 2024 0900  Gross per 24 hour   Intake 240 ml   Output --   Net 240 ml          Medications   Scheduled Medications  atomoxetine, 80 mg, oral, Daily  FLUoxetine, 40 mg, oral, Daily  levothyroxine, 50 mcg, oral, Daily     Continuous Medications    PRN Medications  PRN medications: acetaminophen **OR** acetaminophen **OR** acetaminophen, diphenhydrAMINE, LORazepam       ==================================================  Classification of Paroxysmal Episodes  ==================================================      1.          Diagnosis: Paroxysmal Episodes          Epileptogenic Zone: Unknown           Episode Type:               1.                   Episode Semiology: Generalized Autonomic Episode => Generalized Tonic-Clonic Episode                   Start From:                    Frequency:            Lateralizing Sign:            Etiology:      ==================================================  Current Video/EEG  ==================================================      No known Video/EEG Finding    ==================================================  Impression and Plan  ==================================================      Evolution in last 24 hours: No events. EEG is normal after night of sleep deprivation.       Subjective: No complaints      Objective: No change      Current non-AED Rx:       Impression: This EEG is normal.       Plans:           1. Will continue to hold keppra          2. cvEEG    ==================================================  Medication " Table  ==================================================      1. Date: 11/22/2024  Levetiracetam(dose: 500-0)        2. Date: 11/23/2024  Levetiracetam(dose: 0-0)      ==================================================  Seizure Onset Table  ==================================================      No known seizure onsets.      Ismael Thompson DO  Clinical Epilepsy Fellow

## 2024-11-24 NOTE — CARE PLAN
The patient's goals for the shift include  remaining awake until 0300.    The clinical goals for the shift include Pt will tolerate VEEG monitoring to capture events for clinical correlation and medical management.    Pt tolerated sleep deprivation until 0300. No events were noted.

## 2024-11-25 ENCOUNTER — HOSPITAL ENCOUNTER (INPATIENT)
Dept: NEUROLOGY | Facility: HOSPITAL | Age: 39
Discharge: HOME | DRG: 101 | End: 2024-11-25
Payer: COMMERCIAL

## 2024-11-25 VITALS
HEIGHT: 61 IN | OXYGEN SATURATION: 99 % | SYSTOLIC BLOOD PRESSURE: 102 MMHG | DIASTOLIC BLOOD PRESSURE: 66 MMHG | TEMPERATURE: 97.2 F | HEART RATE: 88 BPM | RESPIRATION RATE: 20 BRPM | WEIGHT: 131 LBS | BODY MASS INDEX: 24.73 KG/M2

## 2024-11-25 LAB
ATRIAL RATE: 72 BPM
P AXIS: 83 DEGREES
P OFFSET: 194 MS
P ONSET: 148 MS
PR INTERVAL: 150 MS
Q ONSET: 223 MS
QRS COUNT: 11 BEATS
QRS DURATION: 68 MS
QT INTERVAL: 388 MS
QTC CALCULATION(BAZETT): 424 MS
QTC FREDERICIA: 412 MS
R AXIS: 80 DEGREES
T AXIS: 55 DEGREES
T OFFSET: 417 MS
VENTRICULAR RATE: 72 BPM

## 2024-11-25 PROCEDURE — 2500000001 HC RX 250 WO HCPCS SELF ADMINISTERED DRUGS (ALT 637 FOR MEDICARE OP): Performed by: CASE MANAGER/CARE COORDINATOR

## 2024-11-25 PROCEDURE — 2500000001 HC RX 250 WO HCPCS SELF ADMINISTERED DRUGS (ALT 637 FOR MEDICARE OP)

## 2024-11-25 PROCEDURE — 95716 VEEG EA 12-26HR CONT MNTR: CPT

## 2024-11-25 PROCEDURE — 2500000002 HC RX 250 W HCPCS SELF ADMINISTERED DRUGS (ALT 637 FOR MEDICARE OP, ALT 636 FOR OP/ED)

## 2024-11-25 RX ORDER — FLUOXETINE HYDROCHLORIDE 20 MG/1
20 CAPSULE ORAL 2 TIMES DAILY
Start: 2024-11-25

## 2024-11-25 ASSESSMENT — PAIN SCALES - GENERAL: PAINLEVEL_OUTOF10: 0 - NO PAIN

## 2024-11-25 NOTE — CARE PLAN
The patient's goals for the shift include      The clinical goals for the shift include Pt will be discharged to home during this shift    Over the shift, the patient was discharged to home. Discharge instructions reviewed with patient. Patient able to verbalize understanding and recall. No prescriptions required. PIV removed intact.

## 2024-11-25 NOTE — DISCHARGE SUMMARY
"Discharge Diagnosis  Seizure disorder (Multi)    First Time Seizures  No this is not the patient's first seizure    Issues Requiring Follow-Up  -Fu with ENT for \"whooshing\" sensation in L ear  -Fu with Neurology to monitor off Keppra    Test Results Pending At Discharge  Pending Labs       No current pending labs.            Hospital Course  Ms. Vanegas was admitted for a 3 day EMU stay. Patient noted that she often misses her nighttime Keppra doses about 3x a week. Keppra was stopped during admission. Trough levels were <2 on labs. Photic stimulation, hyperventilation, and sleep deprivation were utilized to provoke events. EEG did not show any epileptic activity, and pt did not have any clinical events. Keppra was permanently discontinued, and pt will receive MRI Brain outpatient.  She will follow-up with Neurology to monitor off anti-seizure medications.     Home Medications     Medication List      CONTINUE taking these medications     atomoxetine 80 mg capsule; Commonly known as: Strattera   * FLUoxetine 40 mg capsule; Commonly known as: PROzac   * FLUoxetine 20 mg capsule; Commonly known as: PROzac; Take 1 capsule   (20 mg) by mouth 2 times a day.   levothyroxine 50 mcg tablet; Commonly known as: Synthroid, Levoxyl  * This list has 2 medication(s) that are the same as other medications   prescribed for you. Read the directions carefully, and ask your doctor or   other care provider to review them with you.     STOP taking these medications     levETIRAcetam 500 mg tablet; Commonly known as: Keppra       Outpatient Follow-Up  Future Appointments   Date Time Provider Department Center   11/26/2024  5:00 PM [facility]     Branden Mcmahon, DO  PGY-2 Neurology  "

## 2024-11-25 NOTE — DISCHARGE INSTRUCTIONS
Ms. Armenford,    You were admitted to the EMU to characterize the seizure-like events you've had in the past. We stopped the Keppra and you did not have any clinical events. Additionally, the EEG did not show any epileptic activity. Therefore, we are stopping the Keppra. Please obtain the MRI tomorrow 11/26 and follow in clinic. We also placed an ENT referral for the whooshing sound in your left ear. Someone will reach out to help schedule this.    It was a pleasure taking care of you,     Epilepsy Team

## 2024-11-25 NOTE — CARE PLAN
The clinical goals for the shift include Pt will tolerate VEEG monitoring to capture events for clinical correlation and medical management.    Keppra resumed, no events noted.

## 2024-11-26 ENCOUNTER — HOSPITAL ENCOUNTER (OUTPATIENT)
Dept: RADIOLOGY | Facility: HOSPITAL | Age: 39
Discharge: HOME | End: 2024-11-26
Payer: COMMERCIAL

## 2024-11-26 DIAGNOSIS — R56.9 SEIZURE (MULTI): ICD-10-CM

## 2024-11-26 PROCEDURE — 70553 MRI BRAIN STEM W/O & W/DYE: CPT

## 2024-11-26 PROCEDURE — 70553 MRI BRAIN STEM W/O & W/DYE: CPT | Performed by: RADIOLOGY

## 2024-11-26 PROCEDURE — 2550000001 HC RX 255 CONTRASTS: Performed by: STUDENT IN AN ORGANIZED HEALTH CARE EDUCATION/TRAINING PROGRAM

## 2024-11-26 PROCEDURE — A9575 INJ GADOTERATE MEGLUMI 0.1ML: HCPCS | Performed by: STUDENT IN AN ORGANIZED HEALTH CARE EDUCATION/TRAINING PROGRAM

## 2024-11-26 RX ORDER — GADOTERATE MEGLUMINE 376.9 MG/ML
12 INJECTION INTRAVENOUS
Status: COMPLETED | OUTPATIENT
Start: 2024-11-26 | End: 2024-11-26

## 2025-02-03 ENCOUNTER — OFFICE VISIT (OUTPATIENT)
Dept: NEUROLOGY | Facility: HOSPITAL | Age: 40
End: 2025-02-03
Payer: COMMERCIAL

## 2025-02-03 VITALS
SYSTOLIC BLOOD PRESSURE: 120 MMHG | HEART RATE: 85 BPM | BODY MASS INDEX: 24.92 KG/M2 | HEIGHT: 61 IN | WEIGHT: 132 LBS | DIASTOLIC BLOOD PRESSURE: 79 MMHG

## 2025-02-03 DIAGNOSIS — R56.9 SEIZURE-LIKE ACTIVITY (MULTI): Primary | ICD-10-CM

## 2025-02-03 PROCEDURE — 99213 OFFICE O/P EST LOW 20 MIN: CPT | Performed by: STUDENT IN AN ORGANIZED HEALTH CARE EDUCATION/TRAINING PROGRAM

## 2025-02-03 PROCEDURE — 3008F BODY MASS INDEX DOCD: CPT | Performed by: STUDENT IN AN ORGANIZED HEALTH CARE EDUCATION/TRAINING PROGRAM

## 2025-02-03 NOTE — PROGRESS NOTES
Gloria Vanegas is a 39 y.o. year old  right-handed female who presents for a follow up    4D Classification  EPILEPTIC Paroxysmal Events  Semiology: aura-> GTC  History of Status Epilepticus:   Etiology: Unknown  Comorbidities: ADHD, anxiety    Interval history:  She was admitted to EMU for 72 hr. Weaned off the Keppra. NO epileptiform discharges were seen. No events were captures. She feels better without the Keppra, no headache.   Denies any other signs suspicious for seizures such as stiffening, shaking, staring off, gumming/chewing mouth movements, picking movements, loss of continence, tongue biting, or waking up with unexpected soreness.    Prior history  Onset (date): 2015    Evolution and Type(s):    First episode in 2015, when she had 3 GTC  in less than 24 hr, in the setting of Bupropion use. She was on Keppra for few years and eventually weaned off. Next episode in May 2024. She was teaching at work, and felt dizzy.  Next thing she remembers is waking up with other teacher around. She was told she was convulsing and foaming through the mouth. She went to [facility], lactate was elevated. She was placed on Keppra 750 mg bid, but currently taking 750 mg daily with no further seizures. The episode happened also in the setting of double dose of Atomoxetine  (160 mg ) + prozac 80mg.     Risk Factors (including gestational/birth history):  The patient was the product of a normal spontaneous vaginal delivery of a full term birth.  There was no history of febrile convulsions or CNS infections.    Development was normal and developmental milestones were up to par with age.    No history of head trauma with loss of consciousness.   There are no other risk factors for epilepsy.     Past Antiepileptic medication:     Current Antiepileptic Medications:     Past Medical History:   Diagnosis Date    Anesthesia of skin     Numbness and tingling    Anxiety disorder, unspecified     Anxiety    Cervicalgia      "Neck pain    Other amnesia     Memory difficulty    Personal history of other specified conditions     History of headache    Personal history of other specified conditions     History of vertigo    Personal history of other specified conditions     History of weakness    Sleep disorder, unspecified     Sleep difficulties        Past Surgical History:   Procedure Laterality Date    OTHER SURGICAL HISTORY  11/21/2018    Appendectomy        Social History     Social History Narrative    Not on file        Allergies   Allergen Reactions    Doxycycline Anaphylaxis    Codeine Rash        Review of Systems  As per HPI, otherwise all other systems have been reviewed are negative for complaint.           Objective   /70   Pulse 82   Resp 18   Ht 1.549 m (5' 1\")   Wt 59.4 kg (131 lb)   BMI 24.75 kg/m²     Neurological Exam  The patient was alert and oriented to person, time and place. Language, concentration, and memory were intact.  Visual fields were full to confrontation testing. Pupils were equal and reactive to light bilaterally. Extra ocular muscles were normal.  Facial sensation and strength were normal. Tongue movement and shoulder shrug were normal bilaterally.   Gait was normal to toe, heel, and tandem walking without ataxia.  Power, muscle tone, and bulk were normal in all the extremities.  There was no pronator drift or fixation.  Rapid alternating movements were normal.  There was no adventitious movement.  Coordination was intact with no dysmetria.  Sensation was intact.  Tendon reflexes were 2+/4 in all four extremities.  Babinski maneuver elicits flexor response bilaterally.       Results  MR brain w and wo IV contrast    Result Date: 11/27/2024  The ventricular system remains nondilated similar in size and configuration when compared with the prior study dated 12/24/2018. There is again evidence of minimal asymmetry in the size of the temporal horns of the lateral with the left temporal horn noted " be slightly larger when compared with the right which may simply fall within the range of normal variation although minimal asymmetric surrounding brain parenchymal volume loss could give a similar MRI appearance and can not be excluded. Correlation with EEG findings would be recommended.   There is again evidence of a few small scattered predominantly subcortical white matter changes noted within cerebral hemispheres bilaterally with the largest again measuring a proximally 6 mm in greatest axial dimension within the subcortical white matter of the lateral left frontal lobe similar in appearance when compared with the prior study. While nonspecific, subcortical white matter changes can be seen with migraine headaches, hypertension, small-vessel ischemic change, or demyelinating processes among others.   No abnormal intracranial mass lesion or abnormal intracranial enhancement is identified on the postcontrast images.     I personally reviewed the images/study and I agree with Rosie Ceja DO's (radiology resident) findings as stated. This study was interpreted at [facility], [place], [place].   MACRO: None.   Signed by: Sarath Jerez 11/27/2024 3:57 PM Dictation workstation:   [id number]        Assessment/Plan   39 years old patient with 2 seizures inher life, first in 2015 in the setting of Bupropion, and second in 05/2024  after mistakenly took double dose of Atomoxetine and prozac. Prior MRI with no n specifica white matter hyperintensity in the left frontal region. EEG apparently normal. Was admitted to EMU to wean off the Keppra. cvEEG was completely normal.   These episodes could be just triggered by these medications. Recent MRI with no changes compared to prior.     Plan:   Discuss that a normal EEG doesn't exclude the diagnosis of Epilepsy, however there is possibility that her episodes have been just provocked by medications. (Bupropion and SSRis).  Continue  without Keppra   Follow up as needed. She was instructed to call the office in case of any seizure like episode or concerns.  She must follow seizure precautions for now, including no driving for 6 months.

## 2025-04-03 ENCOUNTER — CLINICAL SUPPORT (OUTPATIENT)
Dept: AUDIOLOGY | Facility: CLINIC | Age: 40
End: 2025-04-03
Payer: COMMERCIAL

## 2025-04-03 ENCOUNTER — APPOINTMENT (OUTPATIENT)
Dept: OTOLARYNGOLOGY | Facility: CLINIC | Age: 40
End: 2025-04-03
Payer: COMMERCIAL

## 2025-04-03 VITALS — HEIGHT: 61 IN | BODY MASS INDEX: 24.92 KG/M2 | WEIGHT: 132 LBS

## 2025-04-03 DIAGNOSIS — R42 DIZZINESS: ICD-10-CM

## 2025-04-03 DIAGNOSIS — H93.A2 SUBJECTIVE PULSATILE TINNITUS OF LEFT EAR: Primary | ICD-10-CM

## 2025-04-03 DIAGNOSIS — H93.8X2 SENSATION OF FULLNESS IN LEFT EAR: ICD-10-CM

## 2025-04-03 DIAGNOSIS — H93.12 TINNITUS OF LEFT EAR: Primary | ICD-10-CM

## 2025-04-03 DIAGNOSIS — H90.42 SENSORINEURAL HEARING LOSS (SNHL) OF LEFT EAR WITH UNRESTRICTED HEARING OF RIGHT EAR: ICD-10-CM

## 2025-04-03 DIAGNOSIS — H92.02 EAR DISCOMFORT, LEFT: ICD-10-CM

## 2025-04-03 PROCEDURE — 3008F BODY MASS INDEX DOCD: CPT | Performed by: NURSE PRACTITIONER

## 2025-04-03 PROCEDURE — 92557 COMPREHENSIVE HEARING TEST: CPT | Performed by: AUDIOLOGIST

## 2025-04-03 PROCEDURE — 99204 OFFICE O/P NEW MOD 45 MIN: CPT | Performed by: NURSE PRACTITIONER

## 2025-04-03 PROCEDURE — 1036F TOBACCO NON-USER: CPT | Performed by: NURSE PRACTITIONER

## 2025-04-03 PROCEDURE — 92567 TYMPANOMETRY: CPT | Performed by: AUDIOLOGIST

## 2025-04-03 ASSESSMENT — PATIENT HEALTH QUESTIONNAIRE - PHQ9
2. FEELING DOWN, DEPRESSED OR HOPELESS: NOT AT ALL
1. LITTLE INTEREST OR PLEASURE IN DOING THINGS: NOT AT ALL
SUM OF ALL RESPONSES TO PHQ9 QUESTIONS 1 AND 2: 0

## 2025-04-03 NOTE — PROGRESS NOTES
"Chief Complaint   Patient presents with    Tinnitus    Dizziness       HISTORY:  Gloria Vanegas, age 39 years, was seen for audiogram in conjunction with otolaryngology appointment on 4/3/2025.  Ms. Vanegas reports constant left ear \"whooshing\" pulsatile  tinnitus beginning one year ago.  She denies hearing loss, ear pain, ear pressure and middle ear pathology.  She has history of vertigo occurring two years ago.  She has a history of seizure activity.  Please see medical records for complete history.     RESULTS:  Prior to testing both external auditory canals were clear and tympanic membranes visualized    Immittance and acoustic reflexes:  Immittance testing yielded TYPE A tympanograms indicating normal middle ear function both ears  Acoustic reflexes were not tested    Audiogram:  Normal hearing levels were obtained 125 - 8000 Hz both ears with a mild sensorineural hearing loss noted in the left ear 125 - 250 Hz  Speech reception thresholds obtained at 10 dBHL both ears  Speech discrimination scores were 100% at 40 dBHL    IMPRESSIONS:  Normal middle ear function noted both ears  Normal hearing levels right ear  Mild low frequency sensorineural hearing loss 125 - 250 Hz left ear    RECOMMENDATIONS:  1.  Follow up with otolaryngology  2.  Retest hearing levels annually    time: 911 - [phone]    "

## 2025-04-03 NOTE — PROGRESS NOTES
"Subjective   Patient ID: Gloria Vanegas \"Wendi" is a 39 y.o. female who presents for New Patient Visit (Swooshing in ear/Itchy ear ).  HPI  This patient is referred for evaluation of pulsatile left-sided tinnitus.  The patient is not accompanied by a family member.   When asked about ear pain, hearing loss, discharge from ear, tinnitus, aural fullness or autophony, the patient admits to constant left pulsatile tinnitus for a couple years as well as constant left aural fullness.  She denies any fluctuations in fullness or hearing loss on the left side.  If she tilts her head toward the right the pulsing gets louder.  She reports feeling unsteady often when getting into a hot shower.  She also reports remote history of vertigo lasting days.  She is noticed being sensitive to loud sounds but denies any dizziness or vertigo due to loud sounds.  She endorses autophony only when she is congested.  When asked whether the tinnitus interfered with the patient's daily activities or prevented the patient from falling asleep, the patient reported it does not but she is anxious about possible etiologies.  When asked about a significant past otological history including history of prior ear surgery, noise exposure, exposure to ototoxic drugs or agents, and/or family history of hearing loss, the patient admits to none.    She has a history of seizures and Hashimoto's.  Review of Systems  A comprehensive or 10 points review of the patient's constitutional, neurological, HEENT, pulmonary, cardiovascular and genito-urinary systems showed only those mentioned in history of present illness.    Objective   Physical Exam  Constitutional: no fever, chills, weight loss or weight gain   General appearance: Appears well, well-nourished, well groomed. No acute distress.   Communication: Normal communication   Psychiatric: Oriented to person, place and time. Normal mood and affect.   Neurologic: Cranial nerves II-XII grossly intact and " symmetric bilaterally.   Head and Face:   Head: Atraumatic with no masses, lesions or scarring.   Face: Normal symmetry, no paralysis, synkinesis or facial tic. No scars or deformities.   TMJ: Slight crepitus and left tenderness.  Eyes: Conjunctiva not edematous or erythematous   Ears: External inspection of ears with no deformity, scars or masses. Bilateral EACs clear and bilateral TMs intact with no signs of effusions.  Both ears were examined under the microscope.  No pulsing observed behind the left TM.  No pre or postauricular bruits noted on the left.  Nose: External inspection of nose: No nasal lesions, lacerations or scars.   Neck: Normal appearing, symmetric, trachea midline.  No carotid bruits noted bilaterally.  Pulsing dissipates with left-sided neck compression.  Cardiovascular: Examination of peripheral vascular system shows no clubbing or cyanosis.   Respiratory: No respiratory distress increased work of breathing. Inspection of the chest with symmetric chest expansion and normal respiratory effort.   Skin: No rashes in the head or neck    My interpretation of the audiogram done today is very slight left low pitch sensorineural hearing loss at 125 through 250 Hz otherwise normal hearing bilaterally.  Excellent word recognition scores and normal tympanograms bilaterally.    MRI brain with and without contrast completed November 2024 reports ventricular system remains nondilated, evidence of minimal asymmetry in the size of temporal horns with left being slightly larger, few small scattered white matter changes, no intracranial mass or lesion, mild flattening of the pituitary gland, minimal mucosal thickening in right maxillary sinus and opacification of few left mastoid air cells.    TSH done 3/3/2025 was within normal limits.  CBC drawn 11/22/2024 was unremarkable.      Assessment/Plan        This patient presents for initial evaluation of chronic acquired left subjective pulsatile tinnitus, left  aural fullness, left low pitch asymmetric sensorineural hearing loss.    Reassurance given that otologic exam today is normal.  Audiogram was reviewed in detail.  We discussed that low pitch sensorineural hearing loss often is caused by cochlear hydrops/Ménière's disease.  However, she does not currently experience any other symptoms associated with this.  If she develops fluctuations in hearing loss and/or vertigo, I asked that she contact my office.  We discussed that this would likely be triggered by high sodium intake.  We discussed multiple possible etiologies for the left pulsatile tinnitus.  I recommended starting with CT IAC without contrast.  I will review these images with one of my surgical partners.  I will also review her case with them to see which other imaging study would be best in her case.  We discussed that this would likely be CT angiogram versus MRA.  Patient is in agreement with the plan.  All questions were answered to patient's satisfaction.      45 minutes was spent on this patient's visit. More than 50% of that time was spent in counseling regarding the possible etiologies, test results, treatment options and coordinating care.      This note was created using speech recognition transcription software. Despite proofreading, several typographical errors might be present that might affect the meaning of the content. Please call with any questions.    NU Murray-CNP 04/03/25 10:08 AM

## 2025-04-04 PROBLEM — E55.9 VITAMIN D DEFICIENCY: Status: ACTIVE | Noted: 2019-07-15

## 2025-04-04 PROBLEM — F90.9 ADHD: Status: ACTIVE | Noted: 2023-12-01

## 2025-04-04 PROBLEM — E06.3 HYPOTHYROIDISM DUE TO HASHIMOTO'S THYROIDITIS: Status: ACTIVE | Noted: 2018-08-24

## 2025-04-10 ENCOUNTER — HOSPITAL ENCOUNTER (OUTPATIENT)
Dept: RADIOLOGY | Facility: CLINIC | Age: 40
Discharge: HOME | End: 2025-04-10
Payer: COMMERCIAL

## 2025-04-10 DIAGNOSIS — H93.A2 SUBJECTIVE PULSATILE TINNITUS OF LEFT EAR: ICD-10-CM

## 2025-04-10 PROCEDURE — 70480 CT ORBIT/EAR/FOSSA W/O DYE: CPT

## 2025-04-22 ENCOUNTER — APPOINTMENT (OUTPATIENT)
Dept: ENDOCRINOLOGY | Facility: CLINIC | Age: 40
End: 2025-04-22
Payer: COMMERCIAL

## 2025-04-28 ENCOUNTER — APPOINTMENT (OUTPATIENT)
Dept: OTOLARYNGOLOGY | Facility: CLINIC | Age: 40
End: 2025-04-28
Payer: COMMERCIAL

## 2025-04-28 VITALS — WEIGHT: 132 LBS | HEIGHT: 61 IN | BODY MASS INDEX: 24.92 KG/M2

## 2025-04-28 DIAGNOSIS — H90.42 SENSORINEURAL HEARING LOSS (SNHL) OF LEFT EAR WITH UNRESTRICTED HEARING OF RIGHT EAR: ICD-10-CM

## 2025-04-28 DIAGNOSIS — H93.A2 SUBJECTIVE PULSATILE TINNITUS OF LEFT EAR: Primary | ICD-10-CM

## 2025-04-28 PROCEDURE — 3008F BODY MASS INDEX DOCD: CPT | Performed by: OTOLARYNGOLOGY

## 2025-04-28 PROCEDURE — 99214 OFFICE O/P EST MOD 30 MIN: CPT | Performed by: OTOLARYNGOLOGY

## 2025-04-28 ASSESSMENT — PATIENT HEALTH QUESTIONNAIRE - PHQ9
2. FEELING DOWN, DEPRESSED OR HOPELESS: NOT AT ALL
SUM OF ALL RESPONSES TO PHQ9 QUESTIONS 1 AND 2: 0
1. LITTLE INTEREST OR PLEASURE IN DOING THINGS: NOT AT ALL

## 2025-04-28 NOTE — PROGRESS NOTES
"        Reason for Consult:  Follow-up     Subjective   History Of Present Illness:  Gloria Vanegas \"Pooja\" is a 39 y.o. female with left sided pulsatile tinnitus. There is no change in her hearing. She has low frequency sensorineural hearing loss on her audiogram. She does have a history of seizures managed by neurology. Her recent EEG was normal. Her previous MRI showed no evidence of retrocochlear pathology. She has subcortical white matter changes in her bilateral hemispheres. It did not show dilated ventricles but she may have an empty santiago.       Past Medical History:  She has a past medical history of Anesthesia of skin, Anxiety disorder, unspecified, Cervicalgia, Other amnesia, Personal history of other specified conditions, Personal history of other specified conditions, Personal history of other specified conditions, and Sleep disorder, unspecified.    Surgical History:  She has a past surgical history that includes Other surgical history (11/21/2018).     Social History:  She reports that she has never smoked. She has never been exposed to tobacco smoke. She has never used smokeless tobacco. She reports current alcohol use. She reports that she does not use drugs.    Family History:  family history is not on file.     Medications:  Current Outpatient Medications   Medication Instructions    atomoxetine (Strattera) 80 mg capsule 1 capsule, Daily    FLUoxetine (PROZAC) 40 mg, Daily    FLUoxetine (PROZAC) 20 mg, oral, 2 times daily    levothyroxine (SYNTHROID, LEVOXYL) 50 mcg, Daily RT    PNV no.1/iron,carb/docus/folic (PREN VIT COMB.1-IRON CB-FA-DSS ORAL) Take by mouth.      Allergies:  Doxycycline and Codeine    Review of Systems:   A comprehensive 10-point review of systems was obtained including constitutional, neurological, HEENT, pulmonary, cardiovascular, genito-urinary, and other pertinent systems and was negative except as noted in the HPI.     Objective   Physical Exam:  Last Recorded " "Vitals: Height 1.549 m (5' 1\"), weight 59.9 kg (132 lb).    On physical exam, the patient is a well-nourished, well-developed patient, in no acute distress, able to communicate without assistance in English language. Head and face is atraumatic and normocephalic. Salivary glands are intact. Facial strength is symmetrical bilaterally.       On ear examination:  Right ear: The patient has an open and patent ear canal. The tympanic membrane is intact.  AC>BC  Left ear: The patient has an open and patent ear canal. The tympanic membrane is intact.  AC>BC  The [place] is midline    On vestibular exam, the patient has no spontaneous nystagmus, no headshake nystagmus, no head-thrust nystagmus, and no nystagmus on hyperventilation or Valsalva maneuvers. [place]-Hallpike maneuver is negative bilaterally.       On neuro exam, the patient is alert and oriented x3, cranial nerves are grossly intact, cerebellar exam is normal.      The rest of the exam, including anterior rhinoscopy, oropharyngeal exam, neck exam, and cardiovascular exam, were normal including no palpable lymphadenopathies, thyroid in the midline position, normal pulses, and normal chest excursion. She noticed a decrease in pulsatile tinnitus with compression to the jugular vein noticed on the left. Upon releasing, it reoccurs.      Reviewed Results:  Audiology Testing:  I personally reviewed the audiogram from 04/2025 that showed:   Right ear: normal hearing . Discrimination: 100%   Left ear: low frequency sensorineural hearing loss up-sloping to normal levels . Discrimination: 100%  Type A tympanograms bilaterally.      Imaging:   I personally reviewed the CT of the temporal bone from 04/2025 that showed:  Normal, well-ventilated middle ear and mastoids bilaterally. There is a small area of lack of bone over the sigmoid sinus on the left ear which may be the cause of her pulsatile tinnitus.      I personally reviewed the MRI of the temporal bone from 04/2025 that " "showed:  no evidence of retrocochlear pathology. She has subcortical white matter changes in her bilateral hemispheres. This has been seen before in 2018 and 2016. The is some mild evidence of empty sella.       Procedure:  None    Assessment/Plan     1. Subjective pulsatile tinnitus of left ear    2. Sensorineural hearing loss (SNHL) of left ear with unrestricted hearing of right ear        In summary, Gloria Vanegas \"Pooja\" is a 39 y.o. female with left sided pulsatile tinnitus that stops with compression of jugular vein and reappears upon release. Her CT showed a slight increase size of the sigmoid sinus on the left with increase of emissary vein and an area of dehiscence of sigmoid sinus. This findings could explain the pulsatile tinnitus and we discussed surgery vs. Observation.    Additionally her previous MRI showed an empty sella. This could be associated with increased ICP which in turn can also cause pulsatile tinnitus, however her symptoms should improve with compression of the jugular vein. she will return to to see neurophthalmology for evaluation of papilledema and rule out increase ICP.     We can consider resurfacing her sigmoid sinus and removing emissary vein at any time. This does not seem to effecting her quality of life. She will thick about it.     Follow-up if she would like to proceed with surgery at any point.          ____________________________________________________  Ariel Ayala MD  Professor and Chief   Otology/Neurotology/Lateral Skull-Base Surgery   [facility]    Scribe Attestation  By signing my name below, I, Tabby Gonzalez, Scribe   attest that this documentation has been prepared under the direction and in the presence of Ariel Lewis MD.  "

## 2025-04-28 NOTE — LETTER
"May 3, 2025     Cydney Escamilla, APRN-CNP  [id number]  [facility] [address]    Patient: Pooja Vanegas   YOB: 1985   Date of Visit: 4/28/2025       Dear Dr. Cydney Escamilla, APRN-CNP:    Thank you for referring Pooja Vanegas to me for evaluation. Below are my notes for this consultation.  If you have questions, please do not hesitate to call me. I look forward to following your patient along with you.       Sincerely,     Ariel Lewis MD      CC: Shanelle Thakur MD  ______________________________________________________________________________________            Reason for Consult:  Follow-up     Subjective  History Of Present Illness:  Gloria Vanegas \"Wendi" is a 39 y.o. female with left sided pulsatile tinnitus. There is no change in her hearing. She has low frequency sensorineural hearing loss on her audiogram. She does have a history of seizures managed by neurology. Her recent EEG was normal. Her previous MRI showed no evidence of retrocochlear pathology. She has subcortical white matter changes in her bilateral hemispheres. It did not show dilated ventricles but she may have an empty santiago.       Past Medical History:  She has a past medical history of Anesthesia of skin, Anxiety disorder, unspecified, Cervicalgia, Other amnesia, Personal history of other specified conditions, Personal history of other specified conditions, Personal history of other specified conditions, and Sleep disorder, unspecified.    Surgical History:  She has a past surgical history that includes Other surgical history (11/21/2018).     Social History:  She reports that she has never smoked. She has never been exposed to tobacco smoke. She has never used smokeless tobacco. She reports current alcohol use. She reports that she does not use drugs.    Family History:  family history is not on file.     Medications:  Current Outpatient Medications   Medication Instructions   • atomoxetine (Strattera) 80 " "mg capsule 1 capsule, Daily   • FLUoxetine (PROZAC) 40 mg, Daily   • FLUoxetine (PROZAC) 20 mg, oral, 2 times daily   • levothyroxine (SYNTHROID, LEVOXYL) 50 mcg, Daily RT   • PNV no.1/iron,carb/docus/folic (PREN VIT COMB.1-IRON CB-FA-DSS ORAL) Take by mouth.      Allergies:  Doxycycline and Codeine    Review of Systems:   A comprehensive 10-point review of systems was obtained including constitutional, neurological, HEENT, pulmonary, cardiovascular, genito-urinary, and other pertinent systems and was negative except as noted in the HPI.     Objective  Physical Exam:  Last Recorded Vitals: Height 1.549 m (5' 1\"), weight 59.9 kg (132 lb).    On physical exam, the patient is a well-nourished, well-developed patient, in no acute distress, able to communicate without assistance in English language. Head and face is atraumatic and normocephalic. Salivary glands are intact. Facial strength is symmetrical bilaterally.       On ear examination:  Right ear: The patient has an open and patent ear canal. The tympanic membrane is intact.  AC>BC  Left ear: The patient has an open and patent ear canal. The tympanic membrane is intact.  AC>BC  The [place] is midline    On vestibular exam, the patient has no spontaneous nystagmus, no headshake nystagmus, no head-thrust nystagmus, and no nystagmus on hyperventilation or Valsalva maneuvers. [place]-Hallpike maneuver is negative bilaterally.       On neuro exam, the patient is alert and oriented x3, cranial nerves are grossly intact, cerebellar exam is normal.      The rest of the exam, including anterior rhinoscopy, oropharyngeal exam, neck exam, and cardiovascular exam, were normal including no palpable lymphadenopathies, thyroid in the midline position, normal pulses, and normal chest excursion. She noticed a decrease in pulsatile tinnitus with compression to the jugular vein noticed on the left. Upon releasing, it reoccurs.      Reviewed Results:  Audiology Testing:  I personally " "reviewed the audiogram from 04/2025 that showed:   Right ear: normal hearing . Discrimination: 100%   Left ear: low frequency sensorineural hearing loss up-sloping to normal levels . Discrimination: 100%  Type A tympanograms bilaterally.      Imaging:   I personally reviewed the CT of the temporal bone from 04/2025 that showed:  Normal, well-ventilated middle ear and mastoids bilaterally. There is a small area of lack of bone over the sigmoid sinus on the left ear which may be the cause of her pulsatile tinnitus.      I personally reviewed the MRI of the temporal bone from 04/2025 that showed:  no evidence of retrocochlear pathology. She has subcortical white matter changes in her bilateral hemispheres. This has been seen before in 2018 and 2016. The is some mild evidence of empty sella.       Procedure:  None    Assessment/Plan    1. Subjective pulsatile tinnitus of left ear    2. Sensorineural hearing loss (SNHL) of left ear with unrestricted hearing of right ear        In summary, Glorai Vanegas \"Pooja\" is a 39 y.o. female with left sided pulsatile tinnitus that stops with compression of jugular vein and reappears upon release. Her CT showed a slight increase size of the sigmoid sinus on the left with increase of emissary vein and an area of dehiscence of sigmoid sinus. This findings could explain the pulsatile tinnitus and we discussed surgery vs. Observation.    Additionally her previous MRI showed an empty sella. This could be associated with increased ICP which in turn can also cause pulsatile tinnitus, however her symptoms should improve with compression of the jugular vein. she will return to to see neurophthalmology for evaluation of papilledema and rule out increase ICP.     We can consider resurfacing her sigmoid sinus and removing emissary vein at any time. This does not seem to effecting her quality of life. She will thick about it.     Follow-up if she would like to proceed with surgery at any " point.          ____________________________________________________  Ariel Ayala MD  Professor and Chief   Otology/Neurotology/Lateral Skull-Base Surgery   [facility]    Scribe Attestation  By signing my name below, I, Tabby Gonzalez, Scribe   attest that this documentation has been prepared under the direction and in the presence of Ariel Lewis MD.

## 2025-05-05 ENCOUNTER — APPOINTMENT (OUTPATIENT)
Dept: OTOLARYNGOLOGY | Facility: CLINIC | Age: 40
End: 2025-05-05
Payer: COMMERCIAL

## 2025-05-05 DIAGNOSIS — H93.A2 SUBJECTIVE PULSATILE TINNITUS OF LEFT EAR: Primary | ICD-10-CM

## 2025-05-05 DIAGNOSIS — H90.42 SENSORINEURAL HEARING LOSS (SNHL) OF LEFT EAR WITH UNRESTRICTED HEARING OF RIGHT EAR: ICD-10-CM

## 2025-05-05 PROCEDURE — 99214 OFFICE O/P EST MOD 30 MIN: CPT | Performed by: OTOLARYNGOLOGY

## 2025-05-05 NOTE — PROGRESS NOTES
"        Reason for Consult:  Virtual Visit to discuss plan. Patient is home in [place]     Subjective   History Of Present Illness:  Gloria Vanegas \"Pooja\" is a 39 y.o. female with left sided pulsatile tinnitus that stops with compression of jugular vein and reappears upon release. Her CT showed a slight increase size of the sigmoid sinus on the left with increase of emissary vein and an area of dehiscence of sigmoid sinus. This findings could explain the pulsatile tinnitus and we discussed surgery vs. Observation.     Additionally her previous MRI showed an empty sella. This could be associated with increased ICP which in turn can also cause pulsatile tinnitus, however her symptoms should not improve with compression of the jugular vein. She is planning to return to to see neurophthalmology for evaluation of papilledema and rule out increase ICP.     We discussed considering resurfacing her sigmoid sinus and removing emissary vein at any time. She is here to discuss further and determine if she wants to move forward with surgery.       Past Medical History:  She has a past medical history of Anesthesia of skin, Anxiety disorder, unspecified, Cervicalgia, Other amnesia, Personal history of other specified conditions, Personal history of other specified conditions, Personal history of other specified conditions, and Sleep disorder, unspecified.    Surgical History:  She has a past surgical history that includes Other surgical history (11/21/2018).     Social History:  She reports that she has never smoked. She has never been exposed to tobacco smoke. She has never used smokeless tobacco. She reports current alcohol use. She reports that she does not use drugs.    Family History:  family history is not on file.     Medications:  Current Outpatient Medications   Medication Instructions    atomoxetine (Strattera) 80 mg capsule 1 capsule, Daily    FLUoxetine (PROZAC) 40 mg, Daily    FLUoxetine (PROZAC) 20 mg, oral, 2 " times daily    levothyroxine (SYNTHROID, LEVOXYL) 50 mcg, Daily RT    PNV no.1/iron,carb/docus/folic (PREN VIT COMB.1-IRON CB-FA-DSS ORAL) Take by mouth.      Allergies:  Doxycycline and Codeine    Review of Systems:   A comprehensive 10-point review of systems was obtained including constitutional, neurological, HEENT, pulmonary, cardiovascular, genito-urinary, and other pertinent systems and was negative except as noted in the HPI.     Objective   Physical Exam:  On physical exam, the patient is a well-nourished well-developed patient, in no acute distress able to communicate with assistance in English language.  Head and face is atraumatic and normocephalic, facial strength is symmetrical bilaterally.    On ear examination: Normal Pinna. No further examination performed as this was virtual visit.    On Neuro exam, the patient is alert and oriented x3, cranial nerves are grossly intact.    No further examination performed as this was a virtual visit.      Reviewed Results:  Audiology Testing:  I personally reviewed the audiogram from 04/2025 that showed:             Right ear: normal hearing . Discrimination: 100%             Left ear: low frequency sensorineural hearing loss up-sloping to normal levels . Discrimination: 100%  Type A tympanograms bilaterally.       Imaging:   I personally reviewed the CT of the temporal bone from 04/2025 that showed:  Normal, well-ventilated middle ear and mastoids bilaterally. There is a small area of lack of bone over the sigmoid sinus on the left ear which may be the cause of her pulsatile tinnitus.       I personally reviewed the MRI of the temporal bone from 04/2025 that showed:  no evidence of retrocochlear pathology. She has subcortical white matter changes in her bilateral hemispheres. This has been seen before in 2018 and 2016. The is some mild evidence of empty sella.     Procedure:  None    Assessment/Plan     1. Subjective pulsatile tinnitus of left ear    2.  "Sensorineural hearing loss (SNHL) of left ear with unrestricted hearing of right ear        In summary, Gloria Vanegas \"Pooja\" is a 39 y.o. female with left sided pulsatile tinnitus that stops with compression of jugular vein and reappears upon release. Her CT showed a slight increase size of the sigmoid sinus on the left with increase of emissary vein and an area of dehiscence of sigmoid sinus. This findings could explain the pulsatile tinnitus and we discussed surgery vs. Observation.     Additionally her previous MRI showed an empty sella. This could be associated with increased ICP which in turn can also cause pulsatile tinnitus, however her symptoms should improve with compression of the jugular vein. She is planning to see neurophthalmology for evaluation of papilledema and rule out increase ICP.     The risks, indications, alternatives and complications of  doing right sided transmastoid approach and repair of sigmoid sinus were discussed with the patient and family.  They elected to proceed. We will schedule this in the near future.        ____________________________________________________  Ariel Ayala MD  Professor and Chief   Otology/Neurotology/Lateral Skull-Base Surgery   [facility]    Scribe Attestation  By signing my name below, I, Ajevgeny Carlos, Scribe   attest that this documentation has been prepared under the direction and in the presence of Ariel Lewis MD.  "

## 2025-05-05 NOTE — LETTER
"May 10, 2025     Cydney Escamilla, APRN-CNP  [id number]  [facility] [address]    Patient: Pooja Vanegas   YOB: 1985   Date of Visit: 5/5/2025       Dear Dr. Cydney Escamilla, APRN-CNP:    Thank you for referring Pooja Vanegas to me for evaluation. Below are my notes for this consultation.  If you have questions, please do not hesitate to call me. I look forward to following your patient along with you.       Sincerely,     Ariel Lewis MD      CC: Shanelle Thakur MD  ______________________________________________________________________________________            Reason for Consult:  Virtual Visit to discuss plan. Patient is home in [place]     Subjective  History Of Present Illness:  Gloria Vanegas \"Wendi" is a 39 y.o. female with left sided pulsatile tinnitus that stops with compression of jugular vein and reappears upon release. Her CT showed a slight increase size of the sigmoid sinus on the left with increase of emissary vein and an area of dehiscence of sigmoid sinus. This findings could explain the pulsatile tinnitus and we discussed surgery vs. Observation.     Additionally her previous MRI showed an empty sella. This could be associated with increased ICP which in turn can also cause pulsatile tinnitus, however her symptoms should not improve with compression of the jugular vein. She is planning to return to to see neurophthalmology for evaluation of papilledema and rule out increase ICP.     We discussed considering resurfacing her sigmoid sinus and removing emissary vein at any time. She is here to discuss further and determine if she wants to move forward with surgery.       Past Medical History:  She has a past medical history of Anesthesia of skin, Anxiety disorder, unspecified, Cervicalgia, Other amnesia, Personal history of other specified conditions, Personal history of other specified conditions, Personal history of other specified conditions, and Sleep disorder, " unspecified.    Surgical History:  She has a past surgical history that includes Other surgical history (11/21/2018).     Social History:  She reports that she has never smoked. She has never been exposed to tobacco smoke. She has never used smokeless tobacco. She reports current alcohol use. She reports that she does not use drugs.    Family History:  family history is not on file.     Medications:  Current Outpatient Medications   Medication Instructions   • atomoxetine (Strattera) 80 mg capsule 1 capsule, Daily   • FLUoxetine (PROZAC) 40 mg, Daily   • FLUoxetine (PROZAC) 20 mg, oral, 2 times daily   • levothyroxine (SYNTHROID, LEVOXYL) 50 mcg, Daily RT   • PNV no.1/iron,carb/docus/folic (PREN VIT COMB.1-IRON CB-FA-DSS ORAL) Take by mouth.      Allergies:  Doxycycline and Codeine    Review of Systems:   A comprehensive 10-point review of systems was obtained including constitutional, neurological, HEENT, pulmonary, cardiovascular, genito-urinary, and other pertinent systems and was negative except as noted in the HPI.     Objective  Physical Exam:  On physical exam, the patient is a well-nourished well-developed patient, in no acute distress able to communicate with assistance in English language.  Head and face is atraumatic and normocephalic, facial strength is symmetrical bilaterally.    On ear examination: Normal Pinna. No further examination performed as this was virtual visit.    On Neuro exam, the patient is alert and oriented x3, cranial nerves are grossly intact.    No further examination performed as this was a virtual visit.      Reviewed Results:  Audiology Testing:  I personally reviewed the audiogram from 04/2025 that showed:             Right ear: normal hearing . Discrimination: 100%             Left ear: low frequency sensorineural hearing loss up-sloping to normal levels . Discrimination: 100%  Type A tympanograms bilaterally.       Imaging:   I personally reviewed the CT of the temporal bone  "from 04/2025 that showed:  Normal, well-ventilated middle ear and mastoids bilaterally. There is a small area of lack of bone over the sigmoid sinus on the left ear which may be the cause of her pulsatile tinnitus.       I personally reviewed the MRI of the temporal bone from 04/2025 that showed:  no evidence of retrocochlear pathology. She has subcortical white matter changes in her bilateral hemispheres. This has been seen before in 2018 and 2016. The is some mild evidence of empty sella.     Procedure:  None    Assessment/Plan    1. Subjective pulsatile tinnitus of left ear    2. Sensorineural hearing loss (SNHL) of left ear with unrestricted hearing of right ear        In summary, Gloria Vanegas \"Pooja\" is a 39 y.o. female with left sided pulsatile tinnitus that stops with compression of jugular vein and reappears upon release. Her CT showed a slight increase size of the sigmoid sinus on the left with increase of emissary vein and an area of dehiscence of sigmoid sinus. This findings could explain the pulsatile tinnitus and we discussed surgery vs. Observation.     Additionally her previous MRI showed an empty sella. This could be associated with increased ICP which in turn can also cause pulsatile tinnitus, however her symptoms should improve with compression of the jugular vein. She is planning to see neurophthalmology for evaluation of papilledema and rule out increase ICP.     The risks, indications, alternatives and complications of  doing right sided transmastoid approach and repair of sigmoid sinus were discussed with the patient and family.  They elected to proceed. We will schedule this in the near future.        ____________________________________________________  Ariel Ayala MD  Professor and Chief   Otology/Neurotology/Lateral Skull-Base Surgery   [facility]    Scribe Attestation  By signing my name below, I, Tabby Gonzalez, Scribe   attest that this " documentation has been prepared under the direction and in the presence of Areil Lewis MD.

## 2025-05-14 ENCOUNTER — TELEPHONE (OUTPATIENT)
Dept: OTOLARYNGOLOGY | Facility: HOSPITAL | Age: 40
End: 2025-05-14
Payer: COMMERCIAL

## 2025-05-14 NOTE — TELEPHONE ENCOUNTER
RN s/w patient regarding scheduling surgery with Dr. Ayala. CPT code provided so she can inquire about out of pocket costs with insurance. RN will call patient with a date in June/July after acquiring additional OR time.

## 2025-05-20 ENCOUNTER — TELEPHONE (OUTPATIENT)
Dept: OTOLARYNGOLOGY | Facility: HOSPITAL | Age: 40
End: 2025-05-20
Payer: COMMERCIAL

## 2025-05-20 NOTE — TELEPHONE ENCOUNTER
RN called patient for surgery scheduling with Dr. Ayala. No answer and unable to leave a voicemail.

## 2025-05-22 ENCOUNTER — APPOINTMENT (OUTPATIENT)
Dept: DERMATOLOGY | Facility: CLINIC | Age: 40
End: 2025-05-22
Payer: COMMERCIAL

## 2025-05-22 DIAGNOSIS — L21.9 SEBORRHEIC DERMATITIS: Primary | ICD-10-CM

## 2025-05-22 DIAGNOSIS — L73.8 OTHER SPECIFIED FOLLICULAR DISORDERS: ICD-10-CM

## 2025-05-22 PROCEDURE — 99204 OFFICE O/P NEW MOD 45 MIN: CPT | Performed by: STUDENT IN AN ORGANIZED HEALTH CARE EDUCATION/TRAINING PROGRAM

## 2025-05-22 RX ORDER — CLINDAMYCIN PHOSPHATE 10 MG/G
GEL TOPICAL DAILY
Qty: 60 G | Refills: 11 | Status: SHIPPED | OUTPATIENT
Start: 2025-05-22 | End: 2026-05-22

## 2025-06-02 ENCOUNTER — APPOINTMENT (OUTPATIENT)
Dept: PRIMARY CARE | Facility: CLINIC | Age: 40
End: 2025-06-02
Payer: COMMERCIAL

## 2025-06-03 ENCOUNTER — APPOINTMENT (OUTPATIENT)
Dept: PRIMARY CARE | Facility: CLINIC | Age: 40
End: 2025-06-03
Payer: COMMERCIAL

## 2025-06-03 PROBLEM — E66.3 OVERWEIGHT (BMI 25.0-29.9): Status: RESOLVED | Noted: 2019-07-15 | Resolved: 2025-06-03

## 2025-06-03 PROBLEM — H66.91 ACUTE OTITIS MEDIA, RIGHT: Status: RESOLVED | Noted: 2025-06-03 | Resolved: 2025-06-03

## 2025-06-03 PROBLEM — U07.1 COVID-19: Status: RESOLVED | Noted: 2022-04-21 | Resolved: 2025-06-03

## 2025-06-03 PROBLEM — R55 SYNCOPE: Status: ACTIVE | Noted: 2022-11-08

## 2025-06-03 PROBLEM — R42 VERTIGO: Status: ACTIVE | Noted: 2022-11-08

## 2025-06-04 ENCOUNTER — APPOINTMENT (OUTPATIENT)
Dept: PRIMARY CARE | Facility: CLINIC | Age: 40
End: 2025-06-04
Payer: COMMERCIAL

## 2025-06-05 ENCOUNTER — OFFICE VISIT (OUTPATIENT)
Dept: PRIMARY CARE | Facility: CLINIC | Age: 40
End: 2025-06-05
Payer: COMMERCIAL

## 2025-06-05 VITALS
OXYGEN SATURATION: 100 % | HEIGHT: 61 IN | HEART RATE: 60 BPM | SYSTOLIC BLOOD PRESSURE: 90 MMHG | RESPIRATION RATE: 12 BRPM | TEMPERATURE: 97 F | WEIGHT: 134 LBS | DIASTOLIC BLOOD PRESSURE: 60 MMHG | BODY MASS INDEX: 25.3 KG/M2

## 2025-06-05 DIAGNOSIS — E03.9 HYPOTHYROIDISM, UNSPECIFIED TYPE: ICD-10-CM

## 2025-06-05 DIAGNOSIS — R53.83 OTHER FATIGUE: Primary | ICD-10-CM

## 2025-06-05 DIAGNOSIS — E78.2 MIXED HYPERLIPIDEMIA: ICD-10-CM

## 2025-06-05 PROCEDURE — 99203 OFFICE O/P NEW LOW 30 MIN: CPT

## 2025-06-05 PROCEDURE — 3008F BODY MASS INDEX DOCD: CPT

## 2025-06-05 RX ORDER — SUMATRIPTAN SUCCINATE 50 MG/1
TABLET ORAL
COMMUNITY
Start: 2018-02-15

## 2025-06-05 RX ORDER — PSEUDOEPHEDRINE HCL 30 MG
30 TABLET ORAL
COMMUNITY
Start: 2023-10-26

## 2025-06-05 RX ORDER — FLUCONAZOLE 150 MG/1
150 TABLET ORAL
COMMUNITY
Start: 2023-09-17

## 2025-06-05 RX ORDER — OXYMETAZOLINE HCL 0.05 G/100ML
2 SPRAY NASAL 2 TIMES DAILY
COMMUNITY
Start: 2023-10-26

## 2025-06-05 RX ORDER — LORATADINE 10 MG/1
CAPSULE, LIQUID FILLED ORAL
COMMUNITY

## 2025-06-05 RX ORDER — LEVOTHYROXINE SODIUM 50 UG/1
50 TABLET ORAL
Qty: 30 TABLET | Refills: 3 | Status: SHIPPED | OUTPATIENT
Start: 2025-06-05 | End: 2025-09-03

## 2025-06-05 RX ORDER — LEVETIRACETAM 500 MG/1
500 TABLET ORAL 2 TIMES DAILY
COMMUNITY

## 2025-06-05 NOTE — PROGRESS NOTES
"Subjective   Patient ID: Gloria Vanegas \"Pooja\" is a 40 y.o. female who presents for No chief complaint on file..establish care   Baseline low b/p   HLD  Hypothyroidism     HPI  Fatigue     ROS  General: no fever or night sweats, no change in weight  Eyes: no vision disturbance  ENT: no hearing loss, no hoarseness, no mouth lesions, no sore throat, and no dysphagia  CV: no chest pain, no palpitations, no lower extremity edema  Resp: no shortness of breath, no cough  GI: no abdominal pain, no change in bowel habits  : no urinary problems  MSK: no arthralgias, myalgias, or back pain  Skin; no rashes or new/changed skin lesions  Neuro: no headache, no difficulty walking        Visit Vitals  BP 90/60 (Patient Position: Sitting)   Pulse 60   Temp 36.1 °C (97 °F)   Resp 12          Objective   Physical Exam    Assessment/Plan     Assessment & Plan  Other fatigue    Orders:    CBC; Future    Follow Up In [facility] - PCP - Established; Future    Mixed hyperlipidemia  Orders:    CT cardiac scoring wo IV contrast; Future    Follow Up In [facility] - PCP - Established; Future    Hypothyroidism, unspecified type  Orders:    levothyroxine (Synthroid, Levoxyl) 50 mcg tablet; Take 1 tablet (50 mcg) by mouth once daily.    TSH; Future           Alberta Looney, APRN-CNP       " Primary Care - PCP - Established; Future    Mixed hyperlipidemia  Orders:    CT cardiac scoring wo IV contrast; Future    Follow Up In [facility] - PCP - Established; Future    Lipid Panel; Future    Hypothyroidism, unspecified type  Orders:    levothyroxine (Synthroid, Levoxyl) 50 mcg tablet; Take 1 tablet (50 mcg) by mouth once daily.    TSH; Future           Alberta Looney, APRN-CNP

## 2025-06-11 ENCOUNTER — HOSPITAL ENCOUNTER (OUTPATIENT)
Dept: RADIOLOGY | Facility: HOSPITAL | Age: 40
Discharge: HOME | End: 2025-06-11
Payer: COMMERCIAL

## 2025-06-11 DIAGNOSIS — E78.2 MIXED HYPERLIPIDEMIA: ICD-10-CM

## 2025-06-11 PROCEDURE — 75571 CT HRT W/O DYE W/CA TEST: CPT

## 2025-06-12 ENCOUNTER — TELEPHONE (OUTPATIENT)
Dept: PRIMARY CARE | Facility: CLINIC | Age: 40
End: 2025-06-12
Payer: COMMERCIAL

## 2025-06-12 NOTE — TELEPHONE ENCOUNTER
Left VM that I ordered Lipid profile blood test asked patient to make sure that she is fasting. I also left on VMM before she actually has CT scoring please make sure test is covered by insurance.

## 2025-06-12 NOTE — TELEPHONE ENCOUNTER
Patient called the office re the CT cardiac scoring test, stated she was unsure if it was actually covered by her Ins and stated she is a little uneasy by how much it would be out of pocket.     Patient asked if in the event its not cover by her Insurance if there is anything the office could do seeing as it wouldn't be her fault she got it, stated it was the ordering provider. So she feels as though she shouldn't have to pay or be held accountable.     I advised that usually the patients would call the Ins company to check if it is covered, but because she has already gotten it done and if it is not covered I'm am unsure what she would need to do because we wouldn't have anything to do with that and she would need to contact billing.

## 2025-06-13 ENCOUNTER — TELEPHONE (OUTPATIENT)
Dept: OTOLARYNGOLOGY | Facility: HOSPITAL | Age: 40
End: 2025-06-13
Payer: COMMERCIAL

## 2025-06-17 NOTE — TELEPHONE ENCOUNTER
----- Message from Alberta Looney sent at 6/17/2025  4:07 PM EDT -----  Can you inform the patient that her Calcium scoring test showe low risk for future cardiac events. This test was free.    ----- Message -----  From: Interface, Radiology Results In  Sent: 6/13/2025  12:42 PM EDT  To: Alberta Looney, APRN-CNP

## 2025-07-02 LAB
ERYTHROCYTE [DISTWIDTH] IN BLOOD BY AUTOMATED COUNT: 14.1 % (ref 11–15)
HCT VFR BLD AUTO: 40.5 % (ref 35–45)
HGB BLD-MCNC: 12.9 G/DL (ref 11.7–15.5)
MCH RBC QN AUTO: 31.9 PG (ref 27–33)
MCHC RBC AUTO-ENTMCNC: 31.9 G/DL (ref 32–36)
MCV RBC AUTO: 100.2 FL (ref 80–100)
PLATELET # BLD AUTO: 365 THOUSAND/UL (ref 140–400)
PMV BLD REES-ECKER: 10.1 FL (ref 7.5–12.5)
RBC # BLD AUTO: 4.04 MILLION/UL (ref 3.8–5.1)
TSH SERPL-ACNC: 1.65 MIU/L
WBC # BLD AUTO: 5.5 THOUSAND/UL (ref 3.8–10.8)

## 2025-07-07 ENCOUNTER — APPOINTMENT (OUTPATIENT)
Dept: PRIMARY CARE | Facility: CLINIC | Age: 40
End: 2025-07-07
Payer: COMMERCIAL

## 2025-07-07 VITALS
TEMPERATURE: 97.6 F | BODY MASS INDEX: 25.24 KG/M2 | SYSTOLIC BLOOD PRESSURE: 90 MMHG | RESPIRATION RATE: 16 BRPM | OXYGEN SATURATION: 100 % | DIASTOLIC BLOOD PRESSURE: 60 MMHG | WEIGHT: 133.6 LBS | HEART RATE: 80 BPM

## 2025-07-07 DIAGNOSIS — K59.00 CONSTIPATION, UNSPECIFIED CONSTIPATION TYPE: ICD-10-CM

## 2025-07-07 DIAGNOSIS — E78.2 MIXED HYPERLIPIDEMIA: ICD-10-CM

## 2025-07-07 DIAGNOSIS — Z00.8 ENCOUNTER FOR WORK CAPABILITY ASSESSMENT: Primary | ICD-10-CM

## 2025-07-07 DIAGNOSIS — R53.83 OTHER FATIGUE: ICD-10-CM

## 2025-07-07 PROCEDURE — 99395 PREV VISIT EST AGE 18-39: CPT

## 2025-07-07 ASSESSMENT — PATIENT HEALTH QUESTIONNAIRE - PHQ9
1. LITTLE INTEREST OR PLEASURE IN DOING THINGS: NOT AT ALL
2. FEELING DOWN, DEPRESSED OR HOPELESS: NOT AT ALL
SUM OF ALL RESPONSES TO PHQ9 QUESTIONS 1 AND 2: 0

## 2025-07-07 NOTE — PROGRESS NOTES
"Subjective   Patient ID: Gloria Vanegas \"Pooja\" is a 40 y.o. female who presents for Annual Exam.    HPI  Mrs. 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 The patient states that she feels fine and states that she has decreased red meats out of her diet 2/2 HLD . CT scoring test  LM 0, LAD 4.31, Lcx 0, RCA 0 total 4.31.  We discussed her elevated lipid profile and she states that starting a Statin at this time is not an option because  she plans to become pregnant soon.  She also states that she has no concerns for STI , and declined STD  screening.  \" I am  in a monogamous relationship\" Pt c/o issues of constipation, she states that she periodically takes Mirlax and fiber that helps with her constipation. She denies abdominal pain nausea or vomiting.     Mammogram screening :starts next yr.   Colonoscopy screening :will start at age 46   Pap: sees GYN @ [facility] last Pap 10/2024     ROS  General: no fever or night sweats, no change in weight  Eyes: no vision disturbance  ENT: no hearing loss, no hoarseness, no mouth lesions, no sore throat, and no dysphagia  CV: no chest pain, no palpitations, no lower extremity edema  Resp: no shortness of breath, no cough  GI: no abdominal pain, no change in bowel habits  : no urinary problems  MSK: no arthralgias, myalgias, or back pain  Skin; no rashes or new/changed skin lesions  Neuro: no headache, no difficulty walking        Visit Vitals  BP 90/60   Pulse 80   Temp 36.4 °C (97.6 °F)   Resp 16          Objective   Physical Exam  Vitals reviewed.   HENT:      Head: Normocephalic.      Right Ear: Tympanic membrane, ear canal and external ear normal. There is no impacted cerumen.      Left Ear: Tympanic membrane, ear canal and external ear normal. There is no impacted cerumen.      Nose: " "Nose normal.      Mouth/Throat:      Mouth: Mucous membranes are moist.      Pharynx: Oropharynx is clear. No oropharyngeal exudate or posterior oropharyngeal erythema.   Eyes:      Pupils: Pupils are equal, round, and reactive to light.   Cardiovascular:      Rate and Rhythm: Normal rate and regular rhythm.      Pulses: Normal pulses.      Heart sounds: Normal heart sounds.   Pulmonary:      Effort: Pulmonary effort is normal.      Breath sounds: Normal breath sounds.   Abdominal:      General: Abdomen is flat. Bowel sounds are normal. There is no distension.      Palpations: Abdomen is soft.      Tenderness: There is no abdominal tenderness. There is no right CVA tenderness, left CVA tenderness or guarding.   Genitourinary:     Comments: Deferred   Musculoskeletal:         General: Normal range of motion.   Skin:     General: Skin is warm and dry.      Capillary Refill: Capillary refill takes less than 2 seconds.   Neurological:      General: No focal deficit present.      Mental Status: She is alert and oriented to person, place, and time.      Cranial Nerves: No cranial nerve deficit.      Sensory: No sensory deficit.      Motor: No weakness.   Psychiatric:         Mood and Affect: Mood normal.       PMH  Allergic rhinitis  Anxiety & depression  Covid  Endometriosis  Hashimotos disease   Seizures   Stress incontinence  UTI  ADHD  Vit D defiency  Subjective pulsatile tinnitus L ear   SNHL L ear   Vertigo  Episodic mood disorder     FH  Mother: HLD, hypotension, ovarian Ca (remission) thyroid disease , hysterectomy  Father: Bladder Ca, Heart disease, DM II,   Sister: Mental Health (depression)  Maternal grandfather DM  Maternal grandmother CAD  Paternal grandfather CAD, DM   Breat cancer negative family history  Colon cancer negative family history  + Family history GYN cancers   \"No premature death from CV event\"    Surgeries:   Appendectomy (when pt was 16yrs old)    Social History   Denies smoking  Denies " vaping  Social drinking  No Illicit drug usage    /monogomous relationship     Assessment/Plan     Assessment & Plan  Mixed hyperlipidemia  Discussed order in epic repeat Lipids 6 months   Lifestyle modifications   CT scoring 4.31   Orders:    Follow Up In [facility] - PCP - Established    Encounter for work capability assessment  Orders:    T-Spot TB; Future    Rubeola Antibody, IgG; Future    Mumps Antibody, IgG; Future    Rubella Antibody, IgG; Future    Constipation, unspecified constipation type  Fiber   Increase water intake               Alberta Looney, APRN-CNP

## 2025-07-07 NOTE — PATIENT INSTRUCTIONS
I will call you for abnormal labs   Call your GYN at [facility] for Pap  I will see you back in 1 yr for physical,  Please call me for any issues or if you need to be seen sooner

## 2025-07-11 ENCOUNTER — APPOINTMENT (OUTPATIENT)
Dept: PRIMARY CARE | Facility: CLINIC | Age: 40
End: 2025-07-11
Payer: COMMERCIAL

## 2025-07-13 LAB
IGNF NEG CNTRL BLD: NORMAL
M TB IFN-G BLD-IMP: NORMAL
MEV IGG SER IA-ACNC: 121 AU/ML
MITOGEN IGNF.SPOT COUNT BLD: NORMAL
MUV IGG SER IA-ACNC: 10.9 AU/ML
QUEST PANEL A SPOT COUNT: NORMAL
QUEST PANEL B SPOT COUNT: NORMAL
RUBV IGG SERPL IA-ACNC: 1.23 INDEX

## 2025-07-14 LAB
IGNF NEG CNTRL BLD: NORMAL
M TB IFN-G BLD-IMP: NEGATIVE
MEV IGG SER IA-ACNC: 121 AU/ML
MITOGEN IGNF.SPOT COUNT BLD: NORMAL
MUV IGG SER IA-ACNC: 10.9 AU/ML
QUEST PANEL A SPOT COUNT: 0
QUEST PANEL B SPOT COUNT: 0
RUBV IGG SERPL IA-ACNC: 1.23 INDEX

## 2025-07-15 ENCOUNTER — TELEPHONE (OUTPATIENT)
Dept: PRIMARY CARE | Facility: CLINIC | Age: 40
End: 2025-07-15

## 2025-07-15 ENCOUNTER — APPOINTMENT (OUTPATIENT)
Dept: OPHTHALMOLOGY | Facility: CLINIC | Age: 40
End: 2025-07-15
Payer: COMMERCIAL

## 2025-07-15 DIAGNOSIS — H52.203 MYOPIA OF BOTH EYES WITH ASTIGMATISM AND PRESBYOPIA: Primary | ICD-10-CM

## 2025-07-15 DIAGNOSIS — H52.13 MYOPIA OF BOTH EYES WITH ASTIGMATISM AND PRESBYOPIA: Primary | ICD-10-CM

## 2025-07-15 DIAGNOSIS — H93.A2 SUBJECTIVE PULSATILE TINNITUS OF LEFT EAR: ICD-10-CM

## 2025-07-15 DIAGNOSIS — H52.4 MYOPIA OF BOTH EYES WITH ASTIGMATISM AND PRESBYOPIA: Primary | ICD-10-CM

## 2025-07-15 PROBLEM — H93.13 TINNITUS OF BOTH EARS: Status: ACTIVE | Noted: 2025-07-15

## 2025-07-15 PROCEDURE — 92004 COMPRE OPH EXAM NEW PT 1/>: CPT | Performed by: OPTOMETRIST

## 2025-07-15 PROCEDURE — 92133 CPTRZD OPH DX IMG PST SGM ON: CPT | Performed by: OPTOMETRIST

## 2025-07-15 PROCEDURE — 92015 DETERMINE REFRACTIVE STATE: CPT | Performed by: OPTOMETRIST

## 2025-07-15 ASSESSMENT — EXTERNAL EXAM - RIGHT EYE: OD_EXAM: NORMAL

## 2025-07-15 ASSESSMENT — CUP TO DISC RATIO
OD_RATIO: 0.2
OS_RATIO: 0.2

## 2025-07-15 ASSESSMENT — REFRACTION_MANIFEST
OD_AXIS: 175
OS_CYLINDER: -0.25
OS_SPHERE: -4.25
OS_SPHERE: -4.00
OS_AXIS: 180
OD_CYLINDER: -0.25
OS_AXIS: 005
OS_AXIS: 180
OD_AXIS: 175
OD_CYLINDER: -1.00
OD_SPHERE: -3.50
OD_CYLINDER: -0.50
OS_CYLINDER: -0.25
OD_SPHERE: -3.00
OS_SPHERE: -4.25
OD_SPHERE: -3.00
METHOD_AUTOREFRACTION: 1
OD_AXIS: 170
OS_CYLINDER: -0.25

## 2025-07-15 ASSESSMENT — CONF VISUAL FIELD
OS_NORMAL: 1
OS_INFERIOR_NASAL_RESTRICTION: 0
OD_SUPERIOR_TEMPORAL_RESTRICTION: 0
OS_SUPERIOR_TEMPORAL_RESTRICTION: 0
METHOD: COUNTING FINGERS
OD_NORMAL: 1
OD_SUPERIOR_NASAL_RESTRICTION: 0
OS_SUPERIOR_NASAL_RESTRICTION: 0
OS_INFERIOR_TEMPORAL_RESTRICTION: 0
OD_INFERIOR_NASAL_RESTRICTION: 0
OD_INFERIOR_TEMPORAL_RESTRICTION: 0

## 2025-07-15 ASSESSMENT — ENCOUNTER SYMPTOMS
RESPIRATORY NEGATIVE: 0
EYES NEGATIVE: 1
NEUROLOGICAL NEGATIVE: 0
CONSTITUTIONAL NEGATIVE: 0
ALLERGIC/IMMUNOLOGIC NEGATIVE: 0
PSYCHIATRIC NEGATIVE: 0
HEMATOLOGIC/LYMPHATIC NEGATIVE: 0
CARDIOVASCULAR NEGATIVE: 0
MUSCULOSKELETAL NEGATIVE: 0
ENDOCRINE NEGATIVE: 0
GASTROINTESTINAL NEGATIVE: 0

## 2025-07-15 ASSESSMENT — REFRACTION_WEARINGRX
OD_AXIS: 146
OD_SPHERE: -2.50
OS_SPHERE: -3.75
OD_CYLINDER: -0.50

## 2025-07-15 ASSESSMENT — VISUAL ACUITY
OD_CC: 20/20
METHOD: SNELLEN - LINEAR
OS_CC: 20/20
OS_CC+: -1

## 2025-07-15 ASSESSMENT — EXTERNAL EXAM - LEFT EYE: OS_EXAM: NORMAL

## 2025-07-15 ASSESSMENT — SLIT LAMP EXAM - LIDS
COMMENTS: NORMAL
COMMENTS: NORMAL

## 2025-07-15 ASSESSMENT — TONOMETRY: IOP_METHOD: DEFERRED

## 2025-07-15 NOTE — ASSESSMENT & PLAN NOTE
Per ENT notes - left sided pulsatile tinnitus that stops with compression of jugular vein and reappears upon release. Her CT showed a slight increase size of the sigmoid sinus on the left with increase of emissary vein and an area of dehiscence of sigmoid sinus. MRI w/ empty sella.    RNFL OCT today WNL, No optic nerve head edema noted OU.  Pt is currently in early pregnancy - deferred DFE today.    Pt educated on findings. Monitor annually at this time. Pt voiced understanding.

## 2025-07-15 NOTE — ASSESSMENT & PLAN NOTE
Minimal change from habitual Rx. Pt deferred DFE as she is currently pregnant. Anterior and posterior (undilated) view WNL.   Pt educated on findings. Release Rx for full time wear. Discussed adaptation. Monitor annually. Pt voiced understanding.

## 2025-07-15 NOTE — PROGRESS NOTES
Assessment/Plan   Problem List Items Addressed This Visit          Eye/Vision problems    Myopia of both eyes with astigmatism and presbyopia - Primary    Minimal change from habitual Rx. Pt deferred DFE as she is currently pregnant. Anterior and posterior (undilated) view WNL.   Pt educated on findings. Release Rx for full time wear. Discussed adaptation. Monitor annually. Pt voiced understanding.             Other    Subjective pulsatile tinnitus of left ear    Per ENT notes - left sided pulsatile tinnitus that stops with compression of jugular vein and reappears upon release. Her CT showed a slight increase size of the sigmoid sinus on the left with increase of emissary vein and an area of dehiscence of sigmoid sinus. MRI w/ empty sella.    RNFL OCT today WNL, No optic nerve head edema noted OU.  Pt is currently in early pregnancy - deferred DFE today.    Pt educated on findings. Monitor annually at this time. Pt voiced understanding.          Relevant Orders    OCT, Optic Nerve - OU - Both Eyes (Completed)

## 2025-07-15 NOTE — TELEPHONE ENCOUNTER
Patient was informed that their Employee Medical Statement form is ready for  and that it has been sent to them via [employer]. Patient stated they will stop by the office to  the form.

## 2025-09-15 ENCOUNTER — APPOINTMENT (OUTPATIENT)
Dept: ENDOCRINOLOGY | Facility: CLINIC | Age: 40
End: 2025-09-15
Payer: COMMERCIAL

## 2025-09-18 ENCOUNTER — APPOINTMENT (OUTPATIENT)
Dept: ENDOCRINOLOGY | Facility: CLINIC | Age: 40
End: 2025-09-18
Payer: COMMERCIAL

## 2025-10-27 ENCOUNTER — APPOINTMENT (OUTPATIENT)
Dept: NEUROLOGY | Facility: HOSPITAL | Age: 40
End: 2025-10-27
Payer: COMMERCIAL

## 2026-01-19 ENCOUNTER — APPOINTMENT (OUTPATIENT)
Dept: NEUROLOGY | Facility: HOSPITAL | Age: 41
End: 2026-01-19
Payer: COMMERCIAL